# Patient Record
Sex: MALE | Race: WHITE | NOT HISPANIC OR LATINO | URBAN - METROPOLITAN AREA
[De-identification: names, ages, dates, MRNs, and addresses within clinical notes are randomized per-mention and may not be internally consistent; named-entity substitution may affect disease eponyms.]

---

## 2018-11-29 VITALS
TEMPERATURE: 99 F | DIASTOLIC BLOOD PRESSURE: 62 MMHG | SYSTOLIC BLOOD PRESSURE: 109 MMHG | OXYGEN SATURATION: 99 % | HEART RATE: 58 BPM | WEIGHT: 186.07 LBS | RESPIRATION RATE: 16 BRPM | HEIGHT: 70 IN

## 2018-11-29 PROBLEM — Z00.00 ENCOUNTER FOR PREVENTIVE HEALTH EXAMINATION: Status: ACTIVE | Noted: 2018-11-29

## 2018-11-29 RX ORDER — CHLORHEXIDINE GLUCONATE 213 G/1000ML
1 SOLUTION TOPICAL ONCE
Qty: 0 | Refills: 0 | Status: DISCONTINUED | OUTPATIENT
Start: 2018-11-30 | End: 2018-11-30

## 2018-11-29 NOTE — H&P ADULT - NSHPSOCIALHISTORY_GEN_ALL_CORE
EtoH: social wine but previously excessive daily EtoH use for many years; recreational Marijuana use - last used 2 months ago; denies Tob use.    Works as actor

## 2018-11-29 NOTE — H&P ADULT - ASSESSMENT
63 y.o Male former heavy EtoH user and recreational Marijuana user with FamHx of CAD and PMHx HTN, Hyperlipidemia, recently Dx DM (Hgb A1C 6.6), known CAD s/p 4V CABG 2011 At [facility] (LIMA to LAD, SVG to D1, Free MIGUEL from Diagonal Vein Yousif to OM1, SVG to PDA), chronic systolic CHF who given pt's risk factors, known CAD, abnormal ECHO and stress test with worsened LVEF, now presents for recommended cardiac catheterization with possible intervention to r/o progression of CAD.      ASA: III   Mallampati: III    Precath/consented  ASA 325mg PO taken this AM. Pt loaded with Plavix 600mg PO x 1.  IVF 1/2 NS @ 30cc/hr to KVO given EF 27% and Cr 0.82    Risks & benefits of procedure and alternative therapy have been explained to the patient including but not limited to: allergic reaction, bleeding w/possible need for blood transfusion, infection, renal and vascular compromise, limb damage, arrhythmia, stroke, vessel dissection/perforation, Myocardial infarction, emergent CABG. Informed consent obtained and in chart. 63 y.o Male former heavy EtoH user and recreational Marijuana user with FamHx of CAD and PMHx HTN, Hyperlipidemia, recently Dx DM (Hgb A1C 6.6), known CAD s/p 4V CABG 2011 At [facility] (LIMA to LAD, SVG to D1, Free MIGUEL from Diagonal Vein Yousif to OM1, SVG to PDA), chronic systolic CHF who given pt's risk factors, known CAD, abnormal ECHO and stress test with worsened LVEF, now presents for recommended cardiac catheterization with possible intervention to r/o progression of CAD.      ASA: III   Mallampati: III    Precath/consented  ASA 325mg PO taken this AM. Pt loaded with Plavix 600mg PO x 1.  IVF 1/2 NS @ 30cc/hr to KVO given EF 27% and Cr 0.82  K 3.1 pre-procedure, pt requesting PO KCl. Dr. Mathis made aware, pt repleted with KCl 60mEq PO x 1 prior to cath.    Risks & benefits of procedure and alternative therapy have been explained to the patient including but not limited to: allergic reaction, bleeding w/possible need for blood transfusion, infection, renal and vascular compromise, limb damage, arrhythmia, stroke, vessel dissection/perforation, Myocardial infarction, emergent CABG. Informed consent obtained and in chart.

## 2018-11-29 NOTE — H&P ADULT - HISTORY OF PRESENT ILLNESS
63 y.o Male former heavy EtoH user and recreational Marijuana user with PMHx HTN, Hyperlipidemia, recently Dx DM (Hgb A1C 6.6), known CAD s/p 4V CABG 2011 At [facility], chronic systolic CHF who presented to Cardiologist c/o 8 months of worsening dyspnea on exertion with increased fatigue along with worsening b/l lower extremity pain R>L after walking 1-2 blocks relieved with rest.  He denied any cp, palpitations, n/v, dizziness, syncope, orthopnea, pnd, pedal edema, non-healing ulcerations of legs, hair loss; numbness tingling or rest pain.  ECHO showed LVEF 27% with global hypokinesis and Pharm NST showed EF 32% with inferior and inferoapical infarct with mild dorothy-infarct ischemia.  Per MD notes LE Art duplex showed no stenosis.      Given pt's risk factors, known CAD, abnormal ECHO and stress test with worsened LVEF he is now referred for recommended cardiac catheterization with possible intervention to r/o progression of CAD. 63 y.o Male former heavy EtoH user and recreational Marijuana user with PMHx HTN, Hyperlipidemia, recently Dx DM (Hgb A1C 6.6), known CAD s/p 4V CABG 2011 At [facility], chronic systolic CHF who presented to Cardiologist c/o 8 months of worsening dyspnea on exertion with increased fatigue along with worsening b/l lower extremity pain R>L after walking 1-2 blocks relieved with rest.  He denied any cp, palpitations, n/v, dizziness, syncope, orthopnea, pnd, pedal edema, non-healing ulcerations of legs, hair loss; numbness tingling or rest pain.  ECHO showed LVEF 27% with global hypokinesis, moderate pulmonary HTN PASP approx 50mmHg and Pharm NST showed EF 32% with inferior and inferoapical infarct with mild dorothy-infarct ischemia.  Per MD notes LE Pietro duplex showed no stenosis.      Given pt's risk factors, known CAD, abnormal ECHO and stress test with worsened LVEF he is now referred for recommended cardiac catheterization with possible intervention to r/o progression of CAD. 63 y.o Male former heavy EtoH user and recreational Marijuana user with FamHx of CAD and PMHx HTN, Hyperlipidemia, recently Dx DM (Hgb A1C 6.6), known CAD s/p 4V CABG 2011 At [facility] (LIMA to LAD, SVG to D1, Free MIGUEL from Diagonal Vein Yousif to OM1, SVG to PDA, chronic systolic CHF who presented to Cardiologist c/o 8 months of worsening dyspnea on exertion with increased fatigue along with worsening b/l lower extremity pain R>L after walking 1-2 blocks relieved with rest.  He denied any cp, palpitations, n/v, dizziness, syncope, orthopnea, pnd, pedal edema, non-healing ulcerations of legs, hair loss; numbness tingling or rest pain.  ECHO showed LVEF 27% with global hypokinesis, moderate pulmonary HTN PASP approx 50mmHg and Pharm NST showed EF 32% with inferior and inferoapical infarct with mild dorothy-infarct ischemia.  Per MD notes LE Art duplex showed no stenosis.      Given pt's risk factors, known CAD, abnormal ECHO and stress test with worsened LVEF he is now referred for recommended cardiac catheterization with possible intervention to r/o progression of CAD.

## 2018-11-29 NOTE — H&P ADULT - PMH
CAD (coronary artery disease)    Diabetes  recently diagnosed 1 month ago  Essential hypertension    Hyperlipidemia    Osteoarthritis  of b/l feet

## 2018-11-29 NOTE — H&P ADULT - FAMILY HISTORY
No pertinent family history in first degree relatives Uncle  Still living? Unknown  Family history of early CAD, Age at diagnosis: Age Unknown

## 2018-11-29 NOTE — H&P ADULT - NSHPLABSRESULTS_GEN_ALL_CORE
16.5   9.4   )-----------( 152      ( 30 Nov 2018 12:00 )             50.7   PT/INR - ( 30 Nov 2018 12:00 )   PT: 11.5 sec;   INR: 1.02          PTT - ( 30 Nov 2018 12:00 )  PTT:28.5 sec  11-30    143  |  112<H>  |  24<H>  ----------------------------<  93  3.1<L>   |  22  |  0.82    Ca    7.0<L>      30 Nov 2018 12:00    TPro  6.0  /  Alb  3.5  /  TBili  0.2  /  DBili  x   /  AST  15  /  ALT  23  /  AlkPhos  57  11-30    EKG: Sinus Tyrone @ 57bpm with TWI in V5, V6, aVL. Flattened TWI in I, II, III, Q waves in III and aVF

## 2018-11-29 NOTE — H&P ADULT - RS GEN PE MLT RESP DETAILS PC
no intercostal retractions/no rales/no rhonchi/no wheezes/respirations non-labored/clear to auscultation bilaterally

## 2018-11-30 ENCOUNTER — OUTPATIENT (OUTPATIENT)
Dept: OUTPATIENT SERVICES | Facility: HOSPITAL | Age: 63
LOS: 1 days | Discharge: NURSING, REHAB OR LONG-TERM CARE | End: 2018-11-30
Payer: MEDICARE

## 2018-11-30 DIAGNOSIS — Z95.1 PRESENCE OF AORTOCORONARY BYPASS GRAFT: Chronic | ICD-10-CM

## 2018-11-30 LAB
ALBUMIN SERPL ELPH-MCNC: 3.5 G/DL — SIGNIFICANT CHANGE UP (ref 3.3–5)
ALP SERPL-CCNC: 57 U/L — SIGNIFICANT CHANGE UP (ref 40–120)
ALT FLD-CCNC: 23 U/L — SIGNIFICANT CHANGE UP (ref 10–45)
ANION GAP SERPL CALC-SCNC: 9 MMOL/L — SIGNIFICANT CHANGE UP (ref 5–17)
APTT BLD: 28.5 SEC — SIGNIFICANT CHANGE UP (ref 27.5–36.3)
AST SERPL-CCNC: 15 U/L — SIGNIFICANT CHANGE UP (ref 10–40)
BASOPHILS NFR BLD AUTO: 0.2 % — SIGNIFICANT CHANGE UP (ref 0–2)
BILIRUB SERPL-MCNC: 0.2 MG/DL — SIGNIFICANT CHANGE UP (ref 0.2–1.2)
BUN SERPL-MCNC: 24 MG/DL — HIGH (ref 7–23)
CALCIUM SERPL-MCNC: 7 MG/DL — LOW (ref 8.4–10.5)
CHLORIDE SERPL-SCNC: 112 MMOL/L — HIGH (ref 96–108)
CHOLEST SERPL-MCNC: 123 MG/DL — SIGNIFICANT CHANGE UP (ref 10–199)
CO2 SERPL-SCNC: 22 MMOL/L — SIGNIFICANT CHANGE UP (ref 22–31)
CREAT SERPL-MCNC: 0.82 MG/DL — SIGNIFICANT CHANGE UP (ref 0.5–1.3)
CRP SERPL-MCNC: 1.26 MG/DL — HIGH (ref 0–0.4)
EOSINOPHIL NFR BLD AUTO: 0.7 % — SIGNIFICANT CHANGE UP (ref 0–6)
GLUCOSE SERPL-MCNC: 93 MG/DL — SIGNIFICANT CHANGE UP (ref 70–99)
HBA1C BLD-MCNC: 6 % — HIGH (ref 4–5.6)
HCT VFR BLD CALC: 50.7 % — HIGH (ref 39–50)
HDLC SERPL-MCNC: 26 MG/DL — LOW
HGB BLD-MCNC: 16.5 G/DL — SIGNIFICANT CHANGE UP (ref 13–17)
INR BLD: 1.02 — SIGNIFICANT CHANGE UP (ref 0.88–1.16)
LIPID PNL WITH DIRECT LDL SERPL: 72 MG/DL — SIGNIFICANT CHANGE UP
LYMPHOCYTES # BLD AUTO: 16.1 % — SIGNIFICANT CHANGE UP (ref 13–44)
MCHC RBC-ENTMCNC: 27.9 PG — SIGNIFICANT CHANGE UP (ref 27–34)
MCHC RBC-ENTMCNC: 32.5 G/DL — SIGNIFICANT CHANGE UP (ref 32–36)
MCV RBC AUTO: 85.6 FL — SIGNIFICANT CHANGE UP (ref 80–100)
MONOCYTES NFR BLD AUTO: 9.3 % — SIGNIFICANT CHANGE UP (ref 2–14)
NEUTROPHILS NFR BLD AUTO: 73.7 % — SIGNIFICANT CHANGE UP (ref 43–77)
PLATELET # BLD AUTO: 152 K/UL — SIGNIFICANT CHANGE UP (ref 150–400)
POTASSIUM SERPL-MCNC: 3.1 MMOL/L — LOW (ref 3.5–5.3)
POTASSIUM SERPL-SCNC: 3.1 MMOL/L — LOW (ref 3.5–5.3)
PROT SERPL-MCNC: 6 G/DL — SIGNIFICANT CHANGE UP (ref 6–8.3)
PROTHROM AB SERPL-ACNC: 11.5 SEC — SIGNIFICANT CHANGE UP (ref 10–12.9)
RBC # BLD: 5.92 M/UL — HIGH (ref 4.2–5.8)
RBC # FLD: 14.3 % — SIGNIFICANT CHANGE UP (ref 10.3–16.9)
SODIUM SERPL-SCNC: 143 MMOL/L — SIGNIFICANT CHANGE UP (ref 135–145)
TOTAL CHOLESTEROL/HDL RATIO MEASUREMENT: 4.7 RATIO — SIGNIFICANT CHANGE UP (ref 3.4–9.6)
TRIGL SERPL-MCNC: 125 MG/DL — SIGNIFICANT CHANGE UP (ref 10–149)
WBC # BLD: 9.4 K/UL — SIGNIFICANT CHANGE UP (ref 3.8–10.5)
WBC # FLD AUTO: 9.4 K/UL — SIGNIFICANT CHANGE UP (ref 3.8–10.5)

## 2018-11-30 PROCEDURE — 86140 C-REACTIVE PROTEIN: CPT

## 2018-11-30 PROCEDURE — 93459 L HRT ART/GRFT ANGIO: CPT | Mod: 26

## 2018-11-30 PROCEDURE — G0278: CPT

## 2018-11-30 PROCEDURE — 80053 COMPREHEN METABOLIC PANEL: CPT

## 2018-11-30 PROCEDURE — 85730 THROMBOPLASTIN TIME PARTIAL: CPT

## 2018-11-30 PROCEDURE — C1889: CPT

## 2018-11-30 PROCEDURE — 85610 PROTHROMBIN TIME: CPT

## 2018-11-30 PROCEDURE — C1769: CPT

## 2018-11-30 PROCEDURE — 85025 COMPLETE CBC W/AUTO DIFF WBC: CPT

## 2018-11-30 PROCEDURE — 93010 ELECTROCARDIOGRAM REPORT: CPT

## 2018-11-30 PROCEDURE — 80061 LIPID PANEL: CPT

## 2018-11-30 PROCEDURE — 93005 ELECTROCARDIOGRAM TRACING: CPT

## 2018-11-30 PROCEDURE — C1894: CPT

## 2018-11-30 PROCEDURE — C1887: CPT

## 2018-11-30 PROCEDURE — 83036 HEMOGLOBIN GLYCOSYLATED A1C: CPT

## 2018-11-30 PROCEDURE — 93567 NJX CAR CTH SPRVLV AORTGRPHY: CPT | Mod: XS

## 2018-11-30 PROCEDURE — 75630 X-RAY AORTA LEG ARTERIES: CPT | Mod: 26,59

## 2018-11-30 PROCEDURE — 93459 L HRT ART/GRFT ANGIO: CPT

## 2018-11-30 RX ORDER — SODIUM CHLORIDE 9 MG/ML
500 INJECTION, SOLUTION INTRAVENOUS
Qty: 0 | Refills: 0 | Status: DISCONTINUED | OUTPATIENT
Start: 2018-11-30 | End: 2018-11-30

## 2018-11-30 RX ORDER — POTASSIUM CHLORIDE 20 MEQ
60 PACKET (EA) ORAL ONCE
Qty: 0 | Refills: 0 | Status: COMPLETED | OUTPATIENT
Start: 2018-11-30 | End: 2018-11-30

## 2018-11-30 RX ORDER — CLOPIDOGREL BISULFATE 75 MG/1
600 TABLET, FILM COATED ORAL ONCE
Qty: 0 | Refills: 0 | Status: COMPLETED | OUTPATIENT
Start: 2018-11-30 | End: 2018-11-30

## 2018-11-30 RX ADMIN — Medication 60 MILLIEQUIVALENT(S): at 13:01

## 2018-11-30 RX ADMIN — SODIUM CHLORIDE 30 MILLILITER(S): 9 INJECTION, SOLUTION INTRAVENOUS at 13:02

## 2018-11-30 RX ADMIN — CLOPIDOGREL BISULFATE 600 MILLIGRAM(S): 75 TABLET, FILM COATED ORAL at 13:01

## 2018-11-30 NOTE — PROGRESS NOTE ADULT - SUBJECTIVE AND OBJECTIVE BOX
Interventional Cardiology YULY OBREGONA Discharge Note    Patient without complaints. Ambulated and voided without difficulties    Afebrile, VSS    Ext:    		Left   Groin:  No  hematoma, no bruit, dressing; C/D/I  		      Pulses:    intact DP/PT pulses to baseline     A/P:    64 y/o Male former heavy EtoH user and recreational Marijuana user with FamHx of CAD and PMHx HTN, Hyperlipidemia, recently Dx DM (Hgb A1C 6.6), known CAD s/p 4V CABG 2011 At [facility] (LIMA to LAD, SVG to D1, Free MIGUEL from Diagonal Vein Yousif to OM1, SVG to PDA, chronic systolic CHF who presented to Cardiologist c/o 8 months of worsening dyspnea on exertion with increased fatigue along with worsening b/l lower extremity pain R>L after walking 1-2 blocks relieved with rest.  He denied any cp, palpitations, n/v, dizziness, syncope, orthopnea, pnd, pedal edema, non-healing ulcerations of legs, hair loss; numbness tingling or rest pain.  ECHO showed LVEF 27% with global hypokinesis, moderate pulmonary HTN PASP approx 50mmHg and Pharm NST showed EF 32% with inferior and inferoapical infarct with mild dorothy-infarct ischemia. Per MD notes LE Art duplex showed no stenosis. Given pt's risk factors, known CAD, abnormal ECHO and stress test with worsened LVEF he is now referred for recommended cardiac catheterization with possible intervention to r/o progression of CAD. The patient is now s/p cardiac catheterization 11/30/18 revealing ? left groin manual sheath pull.         1.	Stable for discharge as per attending Dr. Mathis after bed rest, pt voids, groin stable and 30 minutes of ambulation.  2.	Follow-up with PMD/Cardiologist Dr. Bo in 1-2 weeks  3.	Discharged forms signed and copies in chart Interventional Cardiology PA SDA Discharge Note    Patient without complaints. Ambulated and voided without difficulties    Afebrile, VSS    Ext:    		Left   Groin:  No  hematoma, no bruit, dressing; C/D/I  		      Pulses:    intact DP/PT pulses to baseline     A/P:    64 y/o Male former heavy EtoH user and recreational Marijuana user with FamHx of CAD and PMHx HTN, Hyperlipidemia, recently Dx DM (Hgb A1C 6.6), known CAD s/p 4V CABG 2011 At [facility] (LIMA to LAD, SVG to D1, Free MIGUEL from Diagonal Vein Yousif to OM1, SVG to PDA, chronic systolic CHF who presented to Cardiologist c/o 8 months of worsening dyspnea on exertion with increased fatigue along with worsening b/l lower extremity pain R>L after walking 1-2 blocks relieved with rest.  He denied any cp, palpitations, n/v, dizziness, syncope, orthopnea, pnd, pedal edema, non-healing ulcerations of legs, hair loss; numbness tingling or rest pain.  ECHO showed LVEF 27% with global hypokinesis, moderate pulmonary HTN PASP approx 50mmHg and Pharm NST showed EF 32% with inferior and inferoapical infarct with mild dorothy-infarct ischemia. Per MD notes LE Art duplex showed no stenosis. Given pt's risk factors, known CAD, abnormal ECHO and stress test with worsened LVEF he is now referred for recommended cardiac catheterization with possible intervention to r/o progression of CAD. The patient is now s/p cardiac catheterization 11/30/18 revealing 3VCAD s/p CABG; LMCA- luminal, LAD- 100% mid , D1- 100% , LCX- OM1 with 70-80% stenosis, RCA- dominant, mid-RCA  (fills via R-R collaterals mostly); Grafts: LIMA-LAD patent, free MIGUEL-D1 patent, SVG-OM1- patent, SVG-RCA- occluded, LVEF 30%, inferior akinesis, LVEDP ~20mmHg, no AS, 1+MR; Aortogram/Iliac Angiography:  of the common iliac artery. Left groin manual sheath pull.         1.	Stable for discharge as per attending Dr. Mathis after bed rest, pt voids, groin stable and 30 minutes of ambulation.  2.	Follow-up with PMD/Cardiologist Dr. Bo in 1-2 weeks  3.	Discharged forms signed and copies in chart Interventional Cardiology PA SDA Discharge Note    Patient without complaints. Ambulated and voided without difficulties    Afebrile, VSS    Ext:    		Left   Groin:  No  hematoma, no bruit, dressing; C/D/I  		      Pulses:    intact DP/PT pulses to baseline     A/P:    64 y/o Male former heavy EtoH user and recreational Marijuana user with FamHx of CAD and PMHx HTN, Hyperlipidemia, recently Dx DM (Hgb A1C 6.6), known CAD s/p 4V CABG 2011 At [facility] (LIMA to LAD, SVG to D1, Free MIGUEL from Diagonal Vein Yousif to OM1, SVG to PDA, chronic systolic CHF who presented to Cardiologist c/o 8 months of worsening dyspnea on exertion with increased fatigue along with worsening b/l lower extremity pain R>L after walking 1-2 blocks relieved with rest.  He denied any cp, palpitations, n/v, dizziness, syncope, orthopnea, pnd, pedal edema, non-healing ulcerations of legs, hair loss; numbness tingling or rest pain.  ECHO showed LVEF 27% with global hypokinesis, moderate pulmonary HTN PASP approx 50mmHg and Pharm NST showed EF 32% with inferior and inferoapical infarct with mild dorothy-infarct ischemia. Per MD notes LE Art duplex showed no stenosis. Given pt's risk factors, known CAD, abnormal ECHO and stress test with worsened LVEF he is now referred for recommended cardiac catheterization with possible intervention to r/o progression of CAD. The patient is now s/p cardiac catheterization 11/30/18 revealing 3VCAD s/p CABG; LMCA- luminal, LAD- 100% mid , D1- 100% , LCX- OM1 with 70-80% stenosis, RCA- dominant, mid-RCA  (fills via R-R collaterals mostly); Grafts: LIMA-LAD patent, free MIGUEL-D1 patent, SVG-OM1- patent, SVG-RCA- occluded, LVEF 30%, inferior akinesis, LVEDP ~20mmHg, no AS, 1+MR; Aortogram/Iliac Angiography:  of the common iliac artery. Left groin manual sheath pull. Patient instructed to follow-up with Dr. Manzanares/Dr. Agosto for PVD.     1.	Stable for discharge as per attending Dr. Mathis after bed rest, pt voids, groin stable and 30 minutes of ambulation.  2.	Follow-up with PMD/Cardiologist Dr. Bo in 1-2 weeks  3.	Discharged forms signed and copies in chart

## 2018-12-07 ENCOUNTER — MEDICATION RENEWAL (OUTPATIENT)
Age: 63
End: 2018-12-07

## 2018-12-10 PROBLEM — I10 ESSENTIAL (PRIMARY) HYPERTENSION: Chronic | Status: ACTIVE | Noted: 2018-11-29

## 2018-12-10 PROBLEM — E78.5 HYPERLIPIDEMIA, UNSPECIFIED: Chronic | Status: ACTIVE | Noted: 2018-11-29

## 2018-12-10 PROBLEM — I25.10 ATHEROSCLEROTIC HEART DISEASE OF NATIVE CORONARY ARTERY WITHOUT ANGINA PECTORIS: Chronic | Status: ACTIVE | Noted: 2018-11-29

## 2018-12-10 PROBLEM — M19.90 UNSPECIFIED OSTEOARTHRITIS, UNSPECIFIED SITE: Chronic | Status: ACTIVE | Noted: 2018-11-29

## 2018-12-11 ENCOUNTER — APPOINTMENT (OUTPATIENT)
Dept: HEART AND VASCULAR | Facility: CLINIC | Age: 63
End: 2018-12-11
Payer: MEDICARE

## 2018-12-11 VITALS
DIASTOLIC BLOOD PRESSURE: 70 MMHG | WEIGHT: 193.98 LBS | HEIGHT: 68 IN | HEART RATE: 105 BPM | BODY MASS INDEX: 29.4 KG/M2 | SYSTOLIC BLOOD PRESSURE: 110 MMHG | OXYGEN SATURATION: 97 %

## 2018-12-11 VITALS
SYSTOLIC BLOOD PRESSURE: 104 MMHG | OXYGEN SATURATION: 97 % | WEIGHT: 193 LBS | DIASTOLIC BLOOD PRESSURE: 70 MMHG | HEART RATE: 105 BPM | BODY MASS INDEX: 29.25 KG/M2 | RESPIRATION RATE: 12 BRPM | TEMPERATURE: 98.5 F | HEIGHT: 68 IN

## 2018-12-11 VITALS
SYSTOLIC BLOOD PRESSURE: 103 MMHG | TEMPERATURE: 98 F | OXYGEN SATURATION: 97 % | DIASTOLIC BLOOD PRESSURE: 63 MMHG | WEIGHT: 184.97 LBS | RESPIRATION RATE: 17 BRPM | HEART RATE: 66 BPM | HEIGHT: 69 IN

## 2018-12-11 VITALS — TEMPERATURE: 98.5 F | SYSTOLIC BLOOD PRESSURE: 104 MMHG | DIASTOLIC BLOOD PRESSURE: 70 MMHG

## 2018-12-11 DIAGNOSIS — Z86.79 PERSONAL HISTORY OF OTHER DISEASES OF THE CIRCULATORY SYSTEM: ICD-10-CM

## 2018-12-11 DIAGNOSIS — Z78.9 OTHER SPECIFIED HEALTH STATUS: ICD-10-CM

## 2018-12-11 PROCEDURE — 99214 OFFICE O/P EST MOD 30 MIN: CPT

## 2018-12-11 RX ORDER — METOPROLOL TARTRATE 50 MG
1 TABLET ORAL
Qty: 0 | Refills: 0 | COMMUNITY

## 2018-12-11 RX ORDER — ASPIRIN/CALCIUM CARB/MAGNESIUM 324 MG
1 TABLET ORAL
Qty: 0 | Refills: 0 | COMMUNITY

## 2018-12-11 RX ORDER — IBUPROFEN AND FAMOTIDINE 26.6; 8 MG/1; MG/1
1 TABLET, FILM COATED ORAL
Qty: 0 | Refills: 0 | COMMUNITY

## 2018-12-11 RX ORDER — ALLOPURINOL 300 MG
1 TABLET ORAL
Qty: 0 | Refills: 0 | COMMUNITY

## 2018-12-11 NOTE — H&P ADULT - FAMILY HISTORY
Uncle  Still living? Unknown  Family history of early CAD, Age at diagnosis: Age Unknown Uncle  Still living? Unknown  Family history of early CAD, Age at diagnosis: Age Unknown     Mother  Still living? Unknown  Family history of MI (myocardial infarction), Age at diagnosis: Age Unknown

## 2018-12-11 NOTE — H&P ADULT - ASSESSMENT
Euvolemic on exam. IVF hydration: 1/2NS @ 50cc/hr and 1/2NS bolus over 1 hour pre-procedure as d/w Dr. Agosto.  Pt reports compliance to taking ASA 325mg daily and took his AM dose. As d/w Dr. Agosto, pt is loaded with Plavix 600mg PO x1 STAT pre-procedure. 62 y/o Male, former heavy EtoH user and recreational Marijuana user (last use >2 months ago), with FHx of CAD and PMHx of HTN, HLD, DM, Chronic Systolic CHF, known CAD s/p 4V CABG 2011 @ [facility] with most recent Cardiac Cath @ [facility] 11/30/2018, and known PVD with Aortogram/Iliac Angiography 11/30/2018:  of the Common Iliac Artery who presents for staged Peripheral PCI in the setting of patient's risk factors, known PVD, abnormal peripheral Angiography, and worsening LE claudication.    ASA III  Mallampati III    Risks & benefits of procedure and alternative therapy have been explained to the patient including but not limited to: allergic reaction, bleeding w/possible need for blood transfusion, infection, renal and vascular compromise, limb damage, stroke, Myocardial infarction, vessel dissection/perforation, emergent Vascular Surgery. Informed consent obtained and in chart.       Precath/  Consented  - Euvolemic on exam in the setting of LVEF 30% (11/30/18). IVF hydration: 1/2NS @ 50cc/hr and 1/2NS bolus over 1 hour pre-procedure as d/w Dr. Agosto.  - Pt reports compliance to taking ASA 325mg daily and took his AM dose. As d/w Dr. Agosto, pt is loaded with Plavix 600mg PO x1 STAT pre-procedure.   - Today's Hemoglobin A1S is 6.1. Pt is educated about Pre-DM by bedside and encouraged to F/U with PCP. 62 y/o Male, former heavy EtoH user and recreational Marijuana user (last use >2 months ago), with FHx of CAD and PMHx of HTN, HLD, DM, Chronic Systolic CHF, known CAD s/p 4V CABG 2011 @ [facility] with most recent Cardiac Cath @ [facility] 11/30/2018, and known PVD with Aortogram/Iliac Angiography 11/30/2018:  of the Common Iliac Artery who presents for staged Peripheral PCI in the setting of patient's risk factors, known PVD, abnormal peripheral Angiography, and worsening LE claudication.    ASA III  Mallampati III    Risks & benefits of procedure and alternative therapy have been explained to the patient including but not limited to: allergic reaction, bleeding w/possible need for blood transfusion, infection, renal and vascular compromise, limb damage, stroke, Myocardial infarction, vessel dissection/perforation, emergent Vascular Surgery. Informed consent obtained and in chart.       Precath/  Consented  - CKD Stage II: today's creatinine is 1.23 and GFR 62 (previous 10/30/18 creatinine is 0.82 and GFR 94). Euvolemic on exam in the setting of LVEF 30% (11/30/18). IVF hydration: 1/2NS @ 50cc/hr with frequent lung checks and 1/2NS bolus over 1 hour pre-procedure as d/w Dr. Agosto.  - Pt reports compliance to taking ASA 325mg daily and took his AM dose. As d/w Dr. Agosto, pt is loaded with Plavix 600mg PO x1 STAT pre-procedure.   - Today's Hemoglobin A1S is 6.1. Pt is educated about Pre-DM by bedside and encouraged to F/U with PCP.

## 2018-12-11 NOTE — H&P ADULT - HISTORY OF PRESENT ILLNESS
62 y/o Male, former heavy EtoH user and recreational Marijuana user, with FHx of CAD and PMHx of HTN, HLD, DM, Chronic Systolic CHF, known CAD s/p 4V CABG 2011 @ [facility] with most recent Cardiac Cath @ [facility] 11/30/2018, and known PVD with Aortogram/Iliac Angiography 11/30/2018:  of the Common Iliac Artery who presented to his Cardiologist, Dr. Haywood, c/o worsening b/l lower extremity pain R>L after walking 1-2 blocks relieved with rest. Patient denies ..... Given patient's risk factors, known PVD, abnormal peripheral Angiography, and worsening LE claudication, patient is now referred for staged Peripheral PCI.    Of note, Cr. 1.3 on 12/5/2018 and 1.2 8/27/2018 on outpatient labs.    VINH 10/5/2018: Moderate to severe Right PVD by ratios and PVR wave form, abnormal segmental pressure difference bilaterally.  Cardiac Cath @ [facility] 11/30/2018: revealing 3VCAD s/p CABG; LMCA: luminal <20%, mLAD:  complex 100%, pD1: 100% , pOM1: 75% diffuse, mRCA:  complex (fills via R-R collaterals mostly); Grafts: LIMA-LAD: widely patent, free MIGUEL-D1: widely patent, SVG-OM1: widely patent, SVG-RCA: , LVEF 30%, inferior akinesis, LVEDP ~20mmHg, no AS, 1+MR  Pharmacologic NST: inferior and inferoapical infarct with mild dorothy-infarct ischemia, per chart. *** pt bringing in [facility] CABG report***      62 y/o Male, former heavy EtoH user and recreational Marijuana user (last use >2 months ago), with FHx of CAD and PMHx of HTN, HLD, DM, Chronic Systolic CHF, known CAD s/p 4V CABG 2011 @ [facility] with most recent Cardiac Cath @ [facility] 11/30/2018, and known PVD with Aortogram/Iliac Angiography 11/30/2018:  of the Common Iliac Artery who presented to his Cardiologist, Dr. Haywood, c/o worsening b/l lower extremity pain R>L after walking 1-2 blocks or walking up stairs. The patient states that the pain is only better at rest, but comes back when he starts walking again. The pain is constant, 7/10, no radiation. Patient denies LE edema, ulcers in legs/feet, discoloration of LEs, change of hair pattern, CP, SOB, dizziness or syncope. Given patient's risk factors, known PVD, abnormal peripheral Angiography, and worsening LE claudication, patient is now referred for staged Peripheral PCI.    Of note, Cr. 1.3 on 12/5/2018 and 1.2 8/27/2018 on outpatient labs.    VINH 10/5/2018: Moderate to severe Right PVD by ratios and PVR wave form, abnormal segmental pressure difference bilaterally.  Cardiac Cath @ [facility] 11/30/2018: revealing 3VCAD s/p CABG; LMCA: luminal <20%, mLAD:  complex 100%, pD1: 100% , pOM1: 75% diffuse, mRCA:  complex (fills via R-R collaterals mostly); Grafts: LIMA-LAD: widely patent, free MIGUEL-D1: widely patent, SVG-OM1: widely patent, SVG-RCA: , LVEF 30%, inferior akinesis, LVEDP ~20mmHg, no AS, 1+MR  Pharmacologic NST: inferior and inferoapical infarct with mild dorothy-infarct ischemia, per chart. 64 y/o Male, former heavy EtoH user and recreational Marijuana user (last use >2 months ago), with FHx of CAD and PMHx of HTN, HLD, DM, Chronic Systolic CHF, known CAD s/p 4V CABG 2011 @ [facility] with most recent Cardiac Cath @ [facility] 11/30/2018, and known PVD with Aortogram/Iliac Angiography 11/30/2018:  of the Common Iliac Artery who presented to his Cardiologist, Dr. Haywood, c/o worsening b/l lower extremity pain R>L after walking 1-2 blocks or walking up stairs. The patient states that the pain is only better at rest, but comes back when he starts walking again. The pain is constant, 7/10, no radiation. Patient denies LE edema, ulcers in legs/feet, discoloration of LEs, change of hair pattern, CP, SOB, dizziness or syncope. Given patient's risk factors, known PVD, abnormal peripheral Angiography, and worsening LE claudication, patient is now referred for staged Peripheral PCI.    Of note, Cr. 1.3 on 12/5/2018 and 1.2 8/27/2018 on outpatient labs.    VINH 10/5/2018: Moderate to severe Right PVD by ratios and PVR wave form, abnormal segmental pressure difference bilaterally.  Cardiac Cath @ [facility] 11/30/2018: revealing 3VCAD s/p CABG; LMCA: luminal <20%, mLAD:  complex 100%, pD1: 100% , pOM1: 75% diffuse, mRCA:  complex (fills via R-R collaterals mostly); Grafts: LIMA-LAD: widely patent, free MIGUEL-D1: widely patent, SVG-OM1: widely patent, SVG-RCA: , LVEF 30%, inferior akinesis, LVEDP ~20mmHg, no AS, 1+MR  Pharmacologic NST: inferior and inferoapical infarct with mild dorothy-infarct ischemia, per chart.

## 2018-12-11 NOTE — H&P ADULT - SCARS
CABG scars: + well healed midline scar to anterior chest and + scar on RLE towards the midline of the body/location

## 2018-12-11 NOTE — H&P ADULT - PMH
CAD (coronary artery disease)    Diabetes  recently diagnosed 1 month ago  Essential hypertension    Hyperlipidemia    Osteoarthritis  of b/l feet  PVD (peripheral vascular disease)

## 2018-12-11 NOTE — H&P ADULT - NSHPSOCIALHISTORY_GEN_ALL_CORE
EtoH: social wine but previously excessive daily EtoH use for many years; recreational Marijuana use - last used 2 months ago; denies Tob use.

## 2018-12-11 NOTE — H&P ADULT - NSHPLABSRESULTS_GEN_ALL_CORE
15.3   5.9   )-----------( 212      ( 12 Dec 2018 07:54 )             46.3   PT/INR - ( 12 Dec 2018 07:54 )   PT: 11.0 sec;   INR: 0.97          PTT - ( 12 Dec 2018 07:54 )  PTT:29.8 sec 15.3   5.9   )-----------( 212      ( 12 Dec 2018 07:54 )             46.3   PT/INR - ( 12 Dec 2018 07:54 )   PT: 11.0 sec;   INR: 0.97          PTT - ( 12 Dec 2018 07:54 )  PTT:29.8 sec  12-12    138  |  98  |  27<H>  ----------------------------<  112<H>  4.2   |  22  |  1.23    Ca    9.3      12 Dec 2018 07:54    TPro  7.9  /  Alb  4.3  /  TBili  0.2  /  DBili  x   /  AST  40  /  ALT  39  /  AlkPhos  65  12-12

## 2018-12-12 ENCOUNTER — INPATIENT (INPATIENT)
Facility: HOSPITAL | Age: 63
LOS: 0 days | Discharge: HOME | DRG: 271 | End: 2018-12-13
Attending: INTERNAL MEDICINE | Admitting: INTERNAL MEDICINE
Payer: COMMERCIAL

## 2018-12-12 DIAGNOSIS — Z95.1 PRESENCE OF AORTOCORONARY BYPASS GRAFT: Chronic | ICD-10-CM

## 2018-12-12 LAB
ALBUMIN SERPL ELPH-MCNC: 4.3 G/DL — SIGNIFICANT CHANGE UP (ref 3.3–5)
ALP SERPL-CCNC: 65 U/L — SIGNIFICANT CHANGE UP (ref 40–120)
ALT FLD-CCNC: 39 U/L — SIGNIFICANT CHANGE UP (ref 10–45)
ANION GAP SERPL CALC-SCNC: 18 MMOL/L — HIGH (ref 5–17)
APTT BLD: 29.8 SEC — SIGNIFICANT CHANGE UP (ref 27.5–36.3)
AST SERPL-CCNC: 40 U/L — SIGNIFICANT CHANGE UP (ref 10–40)
BASOPHILS NFR BLD AUTO: 0.8 % — SIGNIFICANT CHANGE UP (ref 0–2)
BILIRUB SERPL-MCNC: 0.2 MG/DL — SIGNIFICANT CHANGE UP (ref 0.2–1.2)
BUN SERPL-MCNC: 27 MG/DL — HIGH (ref 7–23)
CALCIUM SERPL-MCNC: 9.3 MG/DL — SIGNIFICANT CHANGE UP (ref 8.4–10.5)
CHLORIDE SERPL-SCNC: 98 MMOL/L — SIGNIFICANT CHANGE UP (ref 96–108)
CHOLEST SERPL-MCNC: 171 MG/DL — SIGNIFICANT CHANGE UP (ref 10–199)
CO2 SERPL-SCNC: 22 MMOL/L — SIGNIFICANT CHANGE UP (ref 22–31)
CREAT SERPL-MCNC: 1.23 MG/DL — SIGNIFICANT CHANGE UP (ref 0.5–1.3)
CRP SERPL-MCNC: 0.46 MG/DL — HIGH (ref 0–0.4)
EOSINOPHIL NFR BLD AUTO: 3.5 % — SIGNIFICANT CHANGE UP (ref 0–6)
GLUCOSE SERPL-MCNC: 112 MG/DL — HIGH (ref 70–99)
HBA1C BLD-MCNC: 6.1 % — HIGH (ref 4–5.6)
HCT VFR BLD CALC: 46.3 % — SIGNIFICANT CHANGE UP (ref 39–50)
HDLC SERPL-MCNC: 38 MG/DL — LOW
HGB BLD-MCNC: 15.3 G/DL — SIGNIFICANT CHANGE UP (ref 13–17)
INR BLD: 0.97 — SIGNIFICANT CHANGE UP (ref 0.88–1.16)
LIPID PNL WITH DIRECT LDL SERPL: 100 MG/DL — SIGNIFICANT CHANGE UP
LYMPHOCYTES # BLD AUTO: 26.7 % — SIGNIFICANT CHANGE UP (ref 13–44)
MCHC RBC-ENTMCNC: 27.7 PG — SIGNIFICANT CHANGE UP (ref 27–34)
MCHC RBC-ENTMCNC: 33 G/DL — SIGNIFICANT CHANGE UP (ref 32–36)
MCV RBC AUTO: 83.9 FL — SIGNIFICANT CHANGE UP (ref 80–100)
MONOCYTES NFR BLD AUTO: 11.3 % — SIGNIFICANT CHANGE UP (ref 2–14)
NEUTROPHILS NFR BLD AUTO: 57.7 % — SIGNIFICANT CHANGE UP (ref 43–77)
PLATELET # BLD AUTO: 212 K/UL — SIGNIFICANT CHANGE UP (ref 150–400)
POTASSIUM SERPL-MCNC: 4.2 MMOL/L — SIGNIFICANT CHANGE UP (ref 3.5–5.3)
POTASSIUM SERPL-SCNC: 4.2 MMOL/L — SIGNIFICANT CHANGE UP (ref 3.5–5.3)
PROT SERPL-MCNC: 7.9 G/DL — SIGNIFICANT CHANGE UP (ref 6–8.3)
PROTHROM AB SERPL-ACNC: 11 SEC — SIGNIFICANT CHANGE UP (ref 10–12.9)
RBC # BLD: 5.52 M/UL — SIGNIFICANT CHANGE UP (ref 4.2–5.8)
RBC # FLD: 14.2 % — SIGNIFICANT CHANGE UP (ref 10.3–16.9)
SODIUM SERPL-SCNC: 138 MMOL/L — SIGNIFICANT CHANGE UP (ref 135–145)
TOTAL CHOLESTEROL/HDL RATIO MEASUREMENT: 4.5 RATIO — SIGNIFICANT CHANGE UP (ref 3.4–9.6)
TRIGL SERPL-MCNC: 167 MG/DL — HIGH (ref 10–149)
WBC # BLD: 5.9 K/UL — SIGNIFICANT CHANGE UP (ref 3.8–10.5)
WBC # FLD AUTO: 5.9 K/UL — SIGNIFICANT CHANGE UP (ref 3.8–10.5)

## 2018-12-12 PROCEDURE — 93010 ELECTROCARDIOGRAM REPORT: CPT

## 2018-12-12 RX ORDER — SODIUM CHLORIDE 9 MG/ML
500 INJECTION, SOLUTION INTRAVENOUS
Qty: 0 | Refills: 0 | Status: DISCONTINUED | OUTPATIENT
Start: 2018-12-12 | End: 2018-12-12

## 2018-12-12 RX ORDER — CLOPIDOGREL BISULFATE 75 MG/1
75 TABLET, FILM COATED ORAL DAILY
Qty: 0 | Refills: 0 | Status: DISCONTINUED | OUTPATIENT
Start: 2018-12-13 | End: 2018-12-13

## 2018-12-12 RX ORDER — CLOPIDOGREL BISULFATE 75 MG/1
600 TABLET, FILM COATED ORAL ONCE
Qty: 0 | Refills: 0 | Status: COMPLETED | OUTPATIENT
Start: 2018-12-12 | End: 2018-12-12

## 2018-12-12 RX ORDER — ATORVASTATIN CALCIUM 80 MG/1
20 TABLET, FILM COATED ORAL AT BEDTIME
Qty: 0 | Refills: 0 | Status: DISCONTINUED | OUTPATIENT
Start: 2018-12-12 | End: 2018-12-13

## 2018-12-12 RX ORDER — CHLORHEXIDINE GLUCONATE 213 G/1000ML
1 SOLUTION TOPICAL ONCE
Qty: 0 | Refills: 0 | Status: DISCONTINUED | OUTPATIENT
Start: 2018-12-12 | End: 2018-12-12

## 2018-12-12 RX ORDER — CILOSTAZOL 100 MG/1
100 TABLET ORAL
Qty: 0 | Refills: 0 | Status: DISCONTINUED | OUTPATIENT
Start: 2018-12-12 | End: 2018-12-13

## 2018-12-12 RX ORDER — LANOLIN ALCOHOL/MO/W.PET/CERES
5 CREAM (GRAM) TOPICAL AT BEDTIME
Qty: 0 | Refills: 0 | Status: DISCONTINUED | OUTPATIENT
Start: 2018-12-12 | End: 2018-12-13

## 2018-12-12 RX ORDER — INFLUENZA VIRUS VACCINE 15; 15; 15; 15 UG/.5ML; UG/.5ML; UG/.5ML; UG/.5ML
0.5 SUSPENSION INTRAMUSCULAR ONCE
Qty: 0 | Refills: 0 | Status: DISCONTINUED | OUTPATIENT
Start: 2018-12-12 | End: 2018-12-13

## 2018-12-12 RX ORDER — ASPIRIN/CALCIUM CARB/MAGNESIUM 324 MG
81 TABLET ORAL DAILY
Qty: 0 | Refills: 0 | Status: DISCONTINUED | OUTPATIENT
Start: 2018-12-13 | End: 2018-12-13

## 2018-12-12 RX ORDER — AMLODIPINE BESYLATE 2.5 MG/1
10 TABLET ORAL DAILY
Qty: 0 | Refills: 0 | Status: DISCONTINUED | OUTPATIENT
Start: 2018-12-13 | End: 2018-12-13

## 2018-12-12 RX ORDER — SODIUM CHLORIDE 9 MG/ML
1000 INJECTION, SOLUTION INTRAVENOUS
Qty: 0 | Refills: 0 | Status: DISCONTINUED | OUTPATIENT
Start: 2018-12-12 | End: 2018-12-13

## 2018-12-12 RX ADMIN — Medication 0.1 MILLIGRAM(S): at 23:03

## 2018-12-12 RX ADMIN — ATORVASTATIN CALCIUM 20 MILLIGRAM(S): 80 TABLET, FILM COATED ORAL at 23:02

## 2018-12-12 RX ADMIN — SODIUM CHLORIDE 50 MILLILITER(S): 9 INJECTION, SOLUTION INTRAVENOUS at 17:51

## 2018-12-12 RX ADMIN — SODIUM CHLORIDE 200 MILLILITER(S): 9 INJECTION, SOLUTION INTRAVENOUS at 10:00

## 2018-12-12 RX ADMIN — Medication 5 MILLIGRAM(S): at 23:02

## 2018-12-12 RX ADMIN — CLOPIDOGREL BISULFATE 600 MILLIGRAM(S): 75 TABLET, FILM COATED ORAL at 10:13

## 2018-12-12 RX ADMIN — CILOSTAZOL 100 MILLIGRAM(S): 100 TABLET ORAL at 18:14

## 2018-12-12 RX ADMIN — CILOSTAZOL 100 MILLIGRAM(S): 100 TABLET ORAL at 23:03

## 2018-12-12 RX ADMIN — SODIUM CHLORIDE 50 MILLILITER(S): 9 INJECTION, SOLUTION INTRAVENOUS at 08:23

## 2018-12-12 RX ADMIN — Medication 0.1 MILLIGRAM(S): at 18:14

## 2018-12-12 NOTE — PROVIDER CONTACT NOTE (OTHER) - RECOMMENDATIONS
Pt informed of risks of bleeding and hematoma formation by getting out of bed. Pt continues to get up

## 2018-12-12 NOTE — PROVIDER CONTACT NOTE (MEDICATION) - BACKGROUND
Pt s/p peripheral cath , PTA/BMS placed in RLE via L groin. L groin incision C/D/I. Pt being non-compliant with bedrest

## 2018-12-12 NOTE — PROGRESS NOTE ADULT - SUBJECTIVE AND OBJECTIVE BOX
Interventional Cardiology PA Sheath Pull Note    Pt without complaints. VSS      Pre-Sheath Removal:    [ ] Right     [x] Left          [ ] Groin    [ ] Brachial    [ ] 5Fr      [ ] 6Fr    [x] 7Fr     [ ] 8Fr    [x] Arterial  [ ] Venous sheath in place    [ ] Hematoma        [ ] Bleed    Pulses:    [ ] Right     [x] Left       DP:  [x]  Doppler   [ ]  Faint    [ ]   1+    [ ] 2+       Hemostasis Achieved with:         22        minutes manual pressure    Vasovagal Reaction: No    Meds Given:      Post-Sheath Removal:    [ ] Right     [x] Left          [ ] Groin    [ ] Brachial    [-] Hematoma        [-] Bleed       [-] Bruit    Pulses:    [ ] Right     [x] Left       DP:  [x]  Doppler   [ ]  Faint    [ ]   1+    [ ] 2+     A/P:  s/p [ ] Dx Cath      [ ] IVUS/FFR     [+] PTA/STENT       [ ] PTCA/STENT    -Continue bedrest (pt given instructions)  -Continue to monitor Interventional Cardiology PA Sheath Pull Note    Pt without complaints. VSS      Pre-Sheath Removal:    [ ] Right     [x] Left          [ ] Groin    [ ] Brachial    [ ] 5Fr      [ ] 6Fr    [x] 7Fr     [ ] 8Fr    [x] Arterial  [ ] Venous sheath in place    [-] Hematoma        [-] Bleed    Pulses:    [ ] Right     [x] Left       DP:  [x]  Doppler   [ ]  Faint    [ ]   1+    [ ] 2+       Hemostasis Achieved with:         22        minutes manual pressure    Vasovagal Reaction: No    Meds Given: None      Post-Sheath Removal:    [ ] Right     [x] Left          [ ] Groin    [ ] Brachial    [-] Hematoma        [-] Bleed       [-] Bruit    Pulses:    [ ] Right     [x] Left       DP:  [x]  Doppler   [ ]  Faint    [ ]   1+    [ ] 2+     A/P:  s/p [ ] Dx Cath      [ ] IVUS/FFR     [+] PTA/ BMS STENT       [ ] PTCA/STENT    -Continue bedrest (pt given instructions)  -Continue to monitor

## 2018-12-13 ENCOUNTER — TRANSCRIPTION ENCOUNTER (OUTPATIENT)
Age: 63
End: 2018-12-13

## 2018-12-13 VITALS — TEMPERATURE: 97 F

## 2018-12-13 LAB
ANION GAP SERPL CALC-SCNC: 13 MMOL/L — SIGNIFICANT CHANGE UP (ref 5–17)
BASOPHILS NFR BLD AUTO: 0.4 % — SIGNIFICANT CHANGE UP (ref 0–2)
BUN SERPL-MCNC: 23 MG/DL — SIGNIFICANT CHANGE UP (ref 7–23)
CALCIUM SERPL-MCNC: 9 MG/DL — SIGNIFICANT CHANGE UP (ref 8.4–10.5)
CHLORIDE SERPL-SCNC: 102 MMOL/L — SIGNIFICANT CHANGE UP (ref 96–108)
CO2 SERPL-SCNC: 27 MMOL/L — SIGNIFICANT CHANGE UP (ref 22–31)
CREAT SERPL-MCNC: 1.44 MG/DL — HIGH (ref 0.5–1.3)
EOSINOPHIL NFR BLD AUTO: 3.4 % — SIGNIFICANT CHANGE UP (ref 0–6)
GLUCOSE SERPL-MCNC: 113 MG/DL — HIGH (ref 70–99)
HCT VFR BLD CALC: 43.8 % — SIGNIFICANT CHANGE UP (ref 39–50)
HGB BLD-MCNC: 14.3 G/DL — SIGNIFICANT CHANGE UP (ref 13–17)
LYMPHOCYTES # BLD AUTO: 8.8 % — LOW (ref 13–44)
MAGNESIUM SERPL-MCNC: 2.1 MG/DL — SIGNIFICANT CHANGE UP (ref 1.6–2.6)
MCHC RBC-ENTMCNC: 27.8 PG — SIGNIFICANT CHANGE UP (ref 27–34)
MCHC RBC-ENTMCNC: 32.6 G/DL — SIGNIFICANT CHANGE UP (ref 32–36)
MCV RBC AUTO: 85.2 FL — SIGNIFICANT CHANGE UP (ref 80–100)
MONOCYTES NFR BLD AUTO: 13.1 % — SIGNIFICANT CHANGE UP (ref 2–14)
NEUTROPHILS NFR BLD AUTO: 74.3 % — SIGNIFICANT CHANGE UP (ref 43–77)
PLATELET # BLD AUTO: 195 K/UL — SIGNIFICANT CHANGE UP (ref 150–400)
POTASSIUM SERPL-MCNC: 4.5 MMOL/L — SIGNIFICANT CHANGE UP (ref 3.5–5.3)
POTASSIUM SERPL-SCNC: 4.5 MMOL/L — SIGNIFICANT CHANGE UP (ref 3.5–5.3)
RBC # BLD: 5.14 M/UL — SIGNIFICANT CHANGE UP (ref 4.2–5.8)
RBC # FLD: 14.2 % — SIGNIFICANT CHANGE UP (ref 10.3–16.9)
SODIUM SERPL-SCNC: 142 MMOL/L — SIGNIFICANT CHANGE UP (ref 135–145)
WBC # BLD: 6.8 K/UL — SIGNIFICANT CHANGE UP (ref 3.8–10.5)
WBC # FLD AUTO: 6.8 K/UL — SIGNIFICANT CHANGE UP (ref 3.8–10.5)

## 2018-12-13 PROCEDURE — 93306 TTE W/DOPPLER COMPLETE: CPT | Mod: 26

## 2018-12-13 PROCEDURE — 99239 HOSP IP/OBS DSCHRG MGMT >30: CPT

## 2018-12-13 RX ORDER — ASPIRIN/CALCIUM CARB/MAGNESIUM 324 MG
1 TABLET ORAL
Qty: 30 | Refills: 11 | OUTPATIENT
Start: 2018-12-13 | End: 2019-12-07

## 2018-12-13 RX ORDER — CLOPIDOGREL BISULFATE 75 MG/1
1 TABLET, FILM COATED ORAL
Qty: 30 | Refills: 0 | OUTPATIENT
Start: 2018-12-13 | End: 2019-01-11

## 2018-12-13 RX ORDER — LISINOPRIL 2.5 MG/1
1 TABLET ORAL
Qty: 0 | Refills: 0 | COMMUNITY

## 2018-12-13 RX ORDER — ASPIRIN/CALCIUM CARB/MAGNESIUM 324 MG
1 TABLET ORAL
Qty: 0 | Refills: 0 | COMMUNITY

## 2018-12-13 RX ORDER — LANOLIN ALCOHOL/MO/W.PET/CERES
1 CREAM (GRAM) TOPICAL
Qty: 30 | Refills: 0 | OUTPATIENT
Start: 2018-12-13 | End: 2019-01-11

## 2018-12-13 RX ADMIN — AMLODIPINE BESYLATE 10 MILLIGRAM(S): 2.5 TABLET ORAL at 06:04

## 2018-12-13 RX ADMIN — CILOSTAZOL 100 MILLIGRAM(S): 100 TABLET ORAL at 11:19

## 2018-12-13 RX ADMIN — CLOPIDOGREL BISULFATE 75 MILLIGRAM(S): 75 TABLET, FILM COATED ORAL at 11:21

## 2018-12-13 RX ADMIN — Medication 81 MILLIGRAM(S): at 11:18

## 2018-12-13 NOTE — DISCHARGE NOTE ADULT - SECONDARY DIAGNOSIS.
Chronic systolic congestive heart failure Hyperlipidemia Essential hypertension ZARIA (acute kidney injury)

## 2018-12-13 NOTE — DISCUSSION/SUMMARY
[___ Week(s)] : [unfilled] week(s) [FreeTextEntry1] : 1. Proceed with attempt of iliac stent.\par 2. Would need more aggressive anti-platelet and cholesterol lowering treatment after the procedure.\par 3. Will arrange for outpatient carotid Duplex USG to rule out carotid stenosis after iliac procedure.\par

## 2018-12-13 NOTE — DISCHARGE NOTE ADULT - CARE PLAN
Principal Discharge DX:	PVD (peripheral vascular disease)  Goal:	Please continue aspirin 81 mg daily and plavix 75 mg daily.  Assessment and plan of treatment:	You underwent a peripheral angiogram and received a stent to the right  Secondary Diagnosis:	Chronic systolic congestive heart failure  Secondary Diagnosis:	Hyperlipidemia  Secondary Diagnosis:	Essential hypertension Principal Discharge DX:	PVD (peripheral vascular disease)  Goal:	Please continue aspirin 81 mg daily and plavix 75 mg daily.  Assessment and plan of treatment:	You underwent a peripheral angiogram and received a stent to the right external iliac artery. The procedure was done through the left groin.   You do not need to keep this area covered and you may shower.  Please avoid any heavy lifting  (no more than 3 to 5 lbs) or strenuous activity for five days.  If you develop any swelling, bleeding, hardening of the skin (hematoma formation), acute pain, numbness/tingling  in your leg please contact your doctor immediately or call our 24/7 line: [phone]    NEVER MISS A DOSE OF ASPIRIN OR PLAVIX; IF YOU DO, YOU ARE AT RISK OF YOUR STENT CLOSING AND HAVING A HEART ATTACK. DO NOT STOP THESE TWO MEDICATIONS UNLESS INSTRUCTED TO DO SO BY YOUR CARDIOLOGIST    Please follow up with Dr. Bo at your scheduled appointment next week  Secondary Diagnosis:	Chronic systolic congestive heart failure  Secondary Diagnosis:	Hyperlipidemia  Secondary Diagnosis:	Essential hypertension  Goal:	Please continue amlodipine 10 mg daily Principal Discharge DX:	PVD (peripheral vascular disease)  Goal:	Please continue aspirin 81 mg daily and plavix 75 mg daily for one month. Then aspirin 81 mg daily indefinitely  Assessment and plan of treatment:	You underwent a peripheral angiogram and received a stent to the right external iliac artery. The procedure was done through the left groin.   You do not need to keep this area covered and you may shower.  Please avoid any heavy lifting  (no more than 3 to 5 lbs) or strenuous activity for five days.  If you develop any swelling, bleeding, hardening of the skin (hematoma formation), acute pain, numbness/tingling  in your leg please contact your doctor immediately or call our 24/7 line: [phone]    NEVER MISS A DOSE OF ASPIRIN OR PLAVIX; IF YOU DO, YOU ARE AT RISK OF YOUR STENT CLOSING AND HAVING A HEART ATTACK. DO NOT STOP THESE TWO MEDICATIONS UNLESS INSTRUCTED TO DO SO BY YOUR CARDIOLOGIST    Please follow up with Dr. Bo at your scheduled appointment next week  Secondary Diagnosis:	Chronic systolic congestive heart failure  Goal:	Please follow up with your cardiologist Dr. Bo at scheduled appointment next week  Assessment and plan of treatment:	You have a history of weakened heart muscle called congestive heart failure.   Please make sure you follow up with your cardiologist within one week of discharge.   Please weigh yourself daily: if you have gained more than 2-3 lbs in one day or 5 lbs in one week contact your doctor immediately as you may be retaining water weight  In addition, restrict your salt intake to less than 2 grams a day  If you develop worsening shortening of breath, leg swelling, fatigue, chest pain, difficulty sleeping at night due to shortness of breath, contact your cardiologist immediately.  Secondary Diagnosis:	Hyperlipidemia  Goal:	Please continue atorvastatin 20 mg daily  Secondary Diagnosis:	Essential hypertension  Goal:	Please continue amlodipine 10 mg daily. Please HOLD lisinopril 40 mg daily and Dr. Bo will discuss when to restart as an outpatient.  Secondary Diagnosis:	ZARIA (acute kidney injury)  Goal:	Please follow up with Dr. Bo at your scheduled appointment next week  Assessment and plan of treatment:	There was an increase in your creatinine (kidney function lab) in response to the contrast dye given during the cardiac catherization in November and the peripheral catheterization yesterday. Dr. Bo will re-check the labs at your follow up appointment. You home medication lisinopril is being held and will be restarted as an outpatient when Dr. Bo deems it appropriate.

## 2018-12-13 NOTE — DISCHARGE NOTE ADULT - PLAN OF CARE
Please continue aspirin 81 mg daily and plavix 75 mg daily. You underwent a peripheral angiogram and received a stent to the right You underwent a peripheral angiogram and received a stent to the right external iliac artery. The procedure was done through the left groin.   You do not need to keep this area covered and you may shower.  Please avoid any heavy lifting  (no more than 3 to 5 lbs) or strenuous activity for five days.  If you develop any swelling, bleeding, hardening of the skin (hematoma formation), acute pain, numbness/tingling  in your leg please contact your doctor immediately or call our 24/7 line: [phone]    NEVER MISS A DOSE OF ASPIRIN OR PLAVIX; IF YOU DO, YOU ARE AT RISK OF YOUR STENT CLOSING AND HAVING A HEART ATTACK. DO NOT STOP THESE TWO MEDICATIONS UNLESS INSTRUCTED TO DO SO BY YOUR CARDIOLOGIST    Please follow up with Dr. Bo at your scheduled appointment next week Please continue amlodipine 10 mg daily Please continue aspirin 81 mg daily and plavix 75 mg daily for one month. Then aspirin 81 mg daily indefinitely Please follow up with your cardiologist Dr. Bo at scheduled appointment next week You have a history of weakened heart muscle called congestive heart failure.   Please make sure you follow up with your cardiologist within one week of discharge.   Please weigh yourself daily: if you have gained more than 2-3 lbs in one day or 5 lbs in one week contact your doctor immediately as you may be retaining water weight  In addition, restrict your salt intake to less than 2 grams a day  If you develop worsening shortening of breath, leg swelling, fatigue, chest pain, difficulty sleeping at night due to shortness of breath, contact your cardiologist immediately. Please continue atorvastatin 20 mg daily Please continue amlodipine 10 mg daily. Please HOLD lisinopril 40 mg daily and Dr. Bo will discuss when to restart as an outpatient. Please follow up with Dr. Bo at your scheduled appointment next week There was an increase in your creatinine (kidney function lab) in response to the contrast dye given during the cardiac catherization in November and the peripheral catheterization yesterday. Dr. Bo will re-check the labs at your follow up appointment. You home medication lisinopril is being held and will be restarted as an outpatient when Dr. Bo deems it appropriate.

## 2018-12-13 NOTE — REVIEW OF SYSTEMS
[Shortness Of Breath] : shortness of breath [Leg Claudication] : intermittent leg claudication [Fever] : no fever [Chills] : no chills [Blurry Vision] : no blurred vision [Seeing Double (Diplopia)] : no diplopia [Chest Pain] : no chest pain [Palpitations] : no palpitations [Cough] : no cough [Wheezing] : no wheezing [Abdominal Pain] : no abdominal pain [Nausea] : no nausea [Change In The Stool] : no change in stool [Urinary Frequency] : no change in urinary frequency [Hematuria] : no hematuria [Nocturia] : no nocturia [Joint Pain] : no joint pain [Joint Swelling] : no joint swelling [Skin: A Rash] : no rash: [Change In Color Of Skin] : change in skin color [Skin Lesions] : no skin lesions [Dizziness] : no dizziness [Convulsions] : no convulsions [Confusion] : no confusion was observed [Anxiety] : no anxiety [Excessive Thirst] : no polydipsia [Easy Bleeding] : no tendency for easy bleeding [Easy Bruising] : no tendency for easy bruising

## 2018-12-13 NOTE — PHYSICAL EXAM
[General Appearance - Well Developed] : well developed [General Appearance - Well Nourished] : well nourished [Normal Conjunctiva] : the conjunctiva exhibited no abnormalities [Normal Oral Mucosa] : normal oral mucosa [JVD Elevated _____cm] : JVD elevated [unfilled] ~U cm above clavicle [Heart Rate And Rhythm] : heart rate and rhythm were normal [Heart Sounds] : normal S1 and S2 [Murmurs] : no murmurs present [Auscultation Breath Sounds / Voice Sounds] : lungs were clear to auscultation bilaterally [Bowel Sounds] : normal bowel sounds [Abdomen Mass (___ Cm)] : no abdominal mass palpated [Abnormal Walk] : normal gait [Nail Clubbing] : no clubbing of the fingernails [Cyanosis, Localized] : no localized cyanosis [] : no ischemic changes [Skin Color & Pigmentation] : normal skin color and pigmentation [No Venous Stasis] : no venous stasis [No Skin Ulcers] : no skin ulcer [Oriented To Time, Place, And Person] : oriented to person, place, and time [Affect] : the affect was normal [Mood] : the mood was normal [FreeTextEntry1] : 2+ left lower extremity pulses, nonpalpable right lower extremity pulses

## 2018-12-13 NOTE — DISCHARGE NOTE ADULT - CARE PROVIDER_API CALL
Macy Bo), Cardiovascular Disease; Internal Medicine  [address] [address]  Phone: [phone]  Fax: [phone]

## 2018-12-13 NOTE — REASON FOR VISIT
[Consultation] : a consultation regarding [FreeTextEntry2] : severe lower extremity claudication with known iliac occlusion

## 2018-12-13 NOTE — DISCHARGE NOTE ADULT - MEDICATION SUMMARY - MEDICATIONS TO TAKE
I will START or STAY ON the medications listed below when I get home from the hospital:    aspirin 81 mg oral delayed release tablet  -- 1 tab(s) by mouth once a day  -- Indication: For PVD (peripheral vascular disease), keeps stent open    cloNIDine 0.1 mg oral tablet  -- 1 tab(s) by mouth 2 times a day  -- Indication: For blood pressure    atorvastatin 20 mg oral tablet  -- 1 tab(s) by mouth once a day  -- Indication: For Cholesterol    clopidogrel 75 mg oral tablet  -- 1 tab(s) by mouth once a day  -- Indication: For PVD (peripheral vascular disease), keeps stent open    amLODIPine 10 mg oral tablet  -- 1 tab(s) by mouth once a day  -- Indication: For blood pressure    cilostazol 100 mg oral tablet  -- 1 tab(s) by mouth 2 times a day  -- Indication: For PVD (peripheral vascular disease)    melatonin 5 mg oral tablet  -- 1 tab(s) by mouth once a day (at bedtime), As needed, Insomnia  -- Indication: For sleep

## 2018-12-13 NOTE — ASSESSMENT
[FreeTextEntry1] : Severe (Bracken Class III) life-style limiting claudication not responding to medical treatment with known right external iliac occlusion (TASC B).  Treatment options including continued medical therapy, supervised exercise program, iliac stenting and surgical bypass discussed with patient in detail, risk and benefit of each approach extensively reviewed.  The patient definitely chooses stenting over surgery given his comorbidities.  He also chooses stent with supervised exercise later over supervised exercise first before deciding on stent, as he need more timely symptomatic relief because of his daily life and job requirements.

## 2018-12-13 NOTE — DISCHARGE NOTE ADULT - HOSPITAL COURSE
64 y/o Male, former heavy EtoH user and recreational Marijuana user (last use >2 months ago), with FHx of CAD and PMHx of HTN, HLD, DM, Chronic Systolic CHF, known CAD s/p 4V CABG 2011 @ [facility] with most recent Cardiac Cath @ [facility] 11/30/2018, and known PVD with Aortogram/Iliac Angiography 11/30/2018:  of the Common Iliac Artery who presented to his Cardiologist, Dr. Haywood, c/o worsening b/l lower extremity pain R>L after walking 1-2 blocks or walking up stairs. The patient states that the pain is only better at rest, but comes back when he starts walking again. The pain is constant, 7/10, no radiation. Patient denies LE edema, ulcers in legs/feet, discoloration of LEs, change of hair pattern, CP, SOB, dizziness or syncope. Given patient's risk factors, known PVD, abnormal peripheral Angiography, and worsening LE claudication, patient is now referred for staged Peripheral PCI. Pt now s/p peripheral cath with PTCA/BMS R external iliac artery, L 7FA sheath, EF 27% by ECHO. Pt received 1/2 NS 200cc bolus pre-cath and hydrated with 1/2 NS @ 50cc/hr prior to cath as per Dr. Agosto. Pt received 100mLs contrast during procedure, will hydrate post-cath 1/2 NS @ 50cc/hr x 6 hours as pt with Cr increase from 0.82 prior to dx cardiac cath on 11/30 to 1.23 12/12 prior to peripheral cath. Pt admitted to 5 uris overnight for observation. VSS, no events on telemetry. Cr 1.44, increased from 1.23 prior to cath. As discussed with outpt cardiologist Dr. Dominguez..... Repeat ECHO showing.....   ASA/Plavix x1 month, then ASA 81mg indefinitely. 64 y/o Male, former heavy EtoH user and recreational Marijuana user (last use >2 months ago), with FHx of CAD and PMHx of HTN, HLD, DM, Chronic Systolic CHF, known CAD s/p 4V CABG 2011 @ [facility] with most recent Cardiac Cath @ [facility] 11/30/2018, and known PVD with Aortogram/Iliac Angiography 11/30/2018:  of the Common Iliac Artery who presented to his Cardiologist, Dr. Haywood, c/o worsening b/l lower extremity pain R>L after walking 1-2 blocks or walking up stairs. The patient states that the pain is only better at rest, but comes back when he starts walking again. The pain is constant, 7/10, no radiation. Patient denies LE edema, ulcers in legs/feet, discoloration of LEs, change of hair pattern, CP, SOB, dizziness or syncope. Given patient's risk factors, known PVD, abnormal peripheral Angiography, and worsening LE claudication, patient is now referred for staged Peripheral PCI. Pt now s/p peripheral cath with PTCA/BMS R external iliac artery, L 7FA sheath, EF 27% by ECHO. Pt received 1/2 NS 200cc bolus pre-cath and hydrated with 1/2 NS @ 50cc/hr prior to cath as per Dr. Agosto. Pt received 100mLs contrast during procedure, will hydrate post-cath 1/2 NS @ 50cc/hr x 6 hours as pt with Cr increase from 0.82 prior to dx cardiac cath on 11/30 to 1.23 12/12 prior to peripheral cath. Pt admitted to 5 uris overnight for observation. VSS, no events on telemetry. Cr 1.44, increased from 1.23 prior to cath. As discussed with outpt cardiologist Dr. Bo lisinopril will be discontinued and will restart as an outpt .Repeat ECHO performed.  L groin site stable, no hematoma, distal pulses intact. ASA/Plavix x1 month, then ASA 81mg indefinitely, atorvastatin 20 mg.  Pt deemed stable for discharge per Dr. Driscoll and will follow up with Dr. Bo next week .

## 2018-12-13 NOTE — DISCHARGE NOTE ADULT - PATIENT PORTAL LINK FT
You can access the [facility], offered by [facility], by registering with the following website: [email or web address]

## 2018-12-17 DIAGNOSIS — I73.9 PERIPHERAL VASCULAR DISEASE, UNSPECIFIED: ICD-10-CM

## 2018-12-17 DIAGNOSIS — R73.03 PREDIABETES: ICD-10-CM

## 2018-12-17 DIAGNOSIS — I70.92 CHRONIC TOTAL OCCLUSION OF ARTERY OF THE EXTREMITIES: ICD-10-CM

## 2018-12-17 DIAGNOSIS — E78.5 HYPERLIPIDEMIA, UNSPECIFIED: ICD-10-CM

## 2018-12-17 DIAGNOSIS — N17.9 ACUTE KIDNEY FAILURE, UNSPECIFIED: ICD-10-CM

## 2018-12-17 DIAGNOSIS — I50.22 CHRONIC SYSTOLIC (CONGESTIVE) HEART FAILURE: ICD-10-CM

## 2018-12-17 DIAGNOSIS — I13.0 HYPERTENSIVE HEART AND CHRONIC KIDNEY DISEASE WITH HEART FAILURE AND STAGE 1 THROUGH STAGE 4 CHRONIC KIDNEY DISEASE, OR UNSPECIFIED CHRONIC KIDNEY DISEASE: ICD-10-CM

## 2018-12-17 DIAGNOSIS — I25.82 CHRONIC TOTAL OCCLUSION OF CORONARY ARTERY: ICD-10-CM

## 2018-12-17 DIAGNOSIS — M19.072 PRIMARY OSTEOARTHRITIS, LEFT ANKLE AND FOOT: ICD-10-CM

## 2018-12-17 DIAGNOSIS — M19.071 PRIMARY OSTEOARTHRITIS, RIGHT ANKLE AND FOOT: ICD-10-CM

## 2018-12-17 DIAGNOSIS — I25.10 ATHEROSCLEROTIC HEART DISEASE OF NATIVE CORONARY ARTERY WITHOUT ANGINA PECTORIS: ICD-10-CM

## 2018-12-17 DIAGNOSIS — I25.810 ATHEROSCLEROSIS OF CORONARY ARTERY BYPASS GRAFT(S) WITHOUT ANGINA PECTORIS: ICD-10-CM

## 2018-12-17 DIAGNOSIS — N18.2 CHRONIC KIDNEY DISEASE, STAGE 2 (MILD): ICD-10-CM

## 2018-12-19 ENCOUNTER — RESULT CHARGE (OUTPATIENT)
Age: 63
End: 2018-12-19

## 2018-12-20 ENCOUNTER — APPOINTMENT (OUTPATIENT)
Dept: HEART AND VASCULAR | Facility: CLINIC | Age: 63
End: 2018-12-20
Payer: MEDICARE

## 2018-12-20 VITALS
TEMPERATURE: 98.8 F | HEIGHT: 68 IN | DIASTOLIC BLOOD PRESSURE: 80 MMHG | OXYGEN SATURATION: 98 % | BODY MASS INDEX: 28.49 KG/M2 | WEIGHT: 187.99 LBS | HEART RATE: 87 BPM | SYSTOLIC BLOOD PRESSURE: 118 MMHG

## 2018-12-20 VITALS — DIASTOLIC BLOOD PRESSURE: 80 MMHG | SYSTOLIC BLOOD PRESSURE: 120 MMHG

## 2018-12-20 DIAGNOSIS — Z82.49 FAMILY HISTORY OF ISCHEMIC HEART DISEASE AND OTHER DISEASES OF THE CIRCULATORY SYSTEM: ICD-10-CM

## 2018-12-20 PROCEDURE — 99214 OFFICE O/P EST MOD 30 MIN: CPT

## 2018-12-20 RX ORDER — ASPIRIN 81 MG/1
81 TABLET ORAL
Refills: 0 | Status: ACTIVE | COMMUNITY

## 2018-12-20 RX ORDER — ASPIRIN 325 MG/1
325 TABLET, FILM COATED ORAL DAILY
Refills: 0 | Status: DISCONTINUED | COMMUNITY
End: 2018-12-20

## 2018-12-27 PROCEDURE — 82962 GLUCOSE BLOOD TEST: CPT

## 2018-12-27 PROCEDURE — 85025 COMPLETE CBC W/AUTO DIFF WBC: CPT

## 2018-12-27 PROCEDURE — 80048 BASIC METABOLIC PNL TOTAL CA: CPT

## 2018-12-27 PROCEDURE — 82553 CREATINE MB FRACTION: CPT

## 2018-12-27 PROCEDURE — C1894: CPT

## 2018-12-27 PROCEDURE — 75630 X-RAY AORTA LEG ARTERIES: CPT

## 2018-12-27 PROCEDURE — 36415 COLL VENOUS BLD VENIPUNCTURE: CPT

## 2018-12-27 PROCEDURE — C1725: CPT

## 2018-12-27 PROCEDURE — C1876: CPT

## 2018-12-27 PROCEDURE — 85730 THROMBOPLASTIN TIME PARTIAL: CPT

## 2018-12-27 PROCEDURE — 85610 PROTHROMBIN TIME: CPT

## 2018-12-27 PROCEDURE — 86140 C-REACTIVE PROTEIN: CPT

## 2018-12-27 PROCEDURE — 37221: CPT

## 2018-12-27 PROCEDURE — 82550 ASSAY OF CK (CPK): CPT

## 2018-12-27 PROCEDURE — 80061 LIPID PANEL: CPT

## 2018-12-27 PROCEDURE — C1887: CPT

## 2018-12-27 PROCEDURE — C1769: CPT

## 2018-12-27 PROCEDURE — 37184 PRIM ART M-THRMBC 1ST VSL: CPT

## 2018-12-27 PROCEDURE — 83036 HEMOGLOBIN GLYCOSYLATED A1C: CPT

## 2018-12-27 PROCEDURE — 80053 COMPREHEN METABOLIC PANEL: CPT

## 2018-12-27 PROCEDURE — 83735 ASSAY OF MAGNESIUM: CPT

## 2018-12-27 PROCEDURE — 93306 TTE W/DOPPLER COMPLETE: CPT

## 2018-12-27 PROCEDURE — 75710 ARTERY X-RAYS ARM/LEG: CPT

## 2018-12-27 PROCEDURE — 93005 ELECTROCARDIOGRAM TRACING: CPT

## 2019-01-07 ENCOUNTER — APPOINTMENT (OUTPATIENT)
Dept: HEART AND VASCULAR | Facility: CLINIC | Age: 64
End: 2019-01-07
Payer: MEDICARE

## 2019-01-07 PROBLEM — I73.9 PERIPHERAL VASCULAR DISEASE, UNSPECIFIED: Chronic | Status: ACTIVE | Noted: 2018-12-11

## 2019-01-07 PROCEDURE — 93922 UPR/L XTREMITY ART 2 LEVELS: CPT

## 2019-01-07 PROCEDURE — 93880 EXTRACRANIAL BILAT STUDY: CPT

## 2019-01-09 ENCOUNTER — OUTPATIENT (OUTPATIENT)
Dept: OUTPATIENT SERVICES | Facility: HOSPITAL | Age: 64
LOS: 1 days | End: 2019-01-09
Payer: MEDICARE

## 2019-01-09 ENCOUNTER — APPOINTMENT (OUTPATIENT)
Dept: CT IMAGING | Facility: HOSPITAL | Age: 64
End: 2019-01-09
Payer: MEDICARE

## 2019-01-09 DIAGNOSIS — Z95.1 PRESENCE OF AORTOCORONARY BYPASS GRAFT: Chronic | ICD-10-CM

## 2019-01-09 PROCEDURE — 70498 CT ANGIOGRAPHY NECK: CPT | Mod: 26

## 2019-01-09 PROCEDURE — 70498 CT ANGIOGRAPHY NECK: CPT

## 2019-01-14 ENCOUNTER — APPOINTMENT (OUTPATIENT)
Dept: HEART AND VASCULAR | Facility: CLINIC | Age: 64
End: 2019-01-14
Payer: MEDICARE

## 2019-01-14 VITALS
SYSTOLIC BLOOD PRESSURE: 102 MMHG | TEMPERATURE: 98.5 F | OXYGEN SATURATION: 98 % | HEIGHT: 68 IN | BODY MASS INDEX: 30.01 KG/M2 | WEIGHT: 198 LBS | DIASTOLIC BLOOD PRESSURE: 80 MMHG | HEART RATE: 73 BPM

## 2019-01-14 DIAGNOSIS — Z86.79 PERSONAL HISTORY OF OTHER DISEASES OF THE CIRCULATORY SYSTEM: ICD-10-CM

## 2019-01-14 DIAGNOSIS — R09.89 OTHER SPECIFIED SYMPTOMS AND SIGNS INVOLVING THE CIRCULATORY AND RESPIRATORY SYSTEMS: ICD-10-CM

## 2019-01-14 DIAGNOSIS — Z86.39 PERSONAL HISTORY OF OTHER ENDOCRINE, NUTRITIONAL AND METABOLIC DISEASE: ICD-10-CM

## 2019-01-14 PROCEDURE — 99215 OFFICE O/P EST HI 40 MIN: CPT

## 2019-01-14 NOTE — REVIEW OF SYSTEMS
[Dyspnea on exertion] : dyspnea during exertion [Fever] : no fever [Chills] : no chills [Blurry Vision] : no blurred vision [Chest Pain] : no chest pain [Leg Claudication] : no intermittent leg claudication [Cough] : no cough [Wheezing] : no wheezing [Abdominal Pain] : no abdominal pain [Urinary Frequency] : no change in urinary frequency [Dizziness] : no dizziness [Numbness (Hypesthesia)] : no numbness [Tingling (Paresthesia)] : no tingling [No Sensation] : denied anesthesia [Facial Weakness] : no facial weakness [Arm Weakness] : no arm weakness [Hand Weakness] : no hand weakness [Leg Weakness] : no leg weakness [Poor Coordination] : good coordination

## 2019-01-14 NOTE — DISCUSSION/SUMMARY
Pre-Visit Chart Review  For Appointment Scheduled on 05/21/18    Health Maintenance Due   Topic Date Due    Mammogram  04/11/2018                      [FreeTextEntry1] : 1. Present clinical data to [facility] core lab and enrollment committee for review.\par 2. If the patient qualifies for [facility], will proceed with GIANCARLO within the next month.\par 3. If he does not qualifies for [facility], will refer him for CEA.\par 4. Will discuss with his urologist regarding timing of prostate bx and potential surgery.  Severe carotid stenosis may poses risk to general anesthesia, if it is required for prostate bx or surgery.  Ideally, it should be postponed until after carotid revascularization and cessation of dual antiplatelet therapy, which is about 2 months from now.

## 2019-01-14 NOTE — ASSESSMENT
[FreeTextEntry1] : Carotid Stenosis: Severe ALEX stenosis, Asymptomatic, in patient at high risk for CEA, because of ischemic DCM with EF <=35% with NYHA Class II-III CHF symptoms.  Treatment options including continued medical treatment, CEA and GIANCARLO discussed with patient in detailed.  Benefits and risks for different approaches extensively explained to the patient.  Specifically there is an upfront risk of dorothy-procedural morbidity and mortality associated with either CEA or GIANCARLO, with clinical trials showing numerically (but not statistically) higher stroke rate for GIANCARLO and higher MI rate for CEA.  He is probably at higher risk for cardiac complication because of CAD and ischemic DCM with low EF.  Because there is no current CMS payment for GIANCARLO, the stent option is only available through company sponsored device trails.  The trial available at [facility] is [facility] sponsored by [email or web address] of the Roadsaver stent with Nanoparasol distal protection.  The patient was told that these are non-FDA approved devices under Phase III trial clinical investigation.  The patient expressed understanding of the condition, treatment options and [facility] trial.  He asked many questions.  After careful consideration, he said he prefers GIANCARLO and would like apply to be enrolled in [facility] trial.  He had meeting with [facility] trial coordinator in the office and asked further questions.  He signed the research consent.

## 2019-01-14 NOTE — PHYSICAL EXAM
[General Appearance - Well Developed] : well developed [General Appearance - Well Nourished] : well nourished [Normal Conjunctiva] : the conjunctiva exhibited no abnormalities [Normal Oral Mucosa] : normal oral mucosa [JVD Elevated _____cm] : JVD elevated [unfilled] ~U cm above clavicle [Auscultation Breath Sounds / Voice Sounds] : lungs were clear to auscultation bilaterally [Heart Sounds] : normal S1 and S2 [Murmurs] : no murmurs present [Edema] : no peripheral edema present [Abdomen Soft] : soft [Abdomen Tenderness] : non-tender [Abnormal Walk] : normal gait [Nail Clubbing] : no clubbing of the fingernails [Cyanosis, Localized] : no localized cyanosis [Skin Color & Pigmentation] : normal skin color and pigmentation [Oriented To Time, Place, And Person] : oriented to person, place, and time [FreeTextEntry1] : CNS II-XII intact, no focal sensory or motor deficit

## 2019-01-14 NOTE — HISTORY OF PRESENT ILLNESS
[FreeTextEntry1] : Found to have severe (>80%) carotid stenosis of right ICA by Duplex USG and confirmed by CTA.  Patient denies h/o recent speech or visual changes, focal weakness or numbness.  No further claudication of RLE after iliac stent, but continue to have BALDWIN after 2-3 blocks walking on the street and severe SOB after running about 10 yards to catch a bus today.  No PND, orthopnea or LE edema.  Atorvastatin was increased to 40 mg po qd but Lisinopril was stopped by cardiology. Apparently was found to have PSA of 22 and is scheduled for prostate bx on 2/20/19.

## 2019-01-25 VITALS
RESPIRATION RATE: 18 BRPM | SYSTOLIC BLOOD PRESSURE: 135 MMHG | HEIGHT: 70 IN | HEART RATE: 73 BPM | WEIGHT: 195.11 LBS | OXYGEN SATURATION: 97 % | DIASTOLIC BLOOD PRESSURE: 86 MMHG | TEMPERATURE: 98 F

## 2019-01-25 NOTE — H&P ADULT - HISTORY OF PRESENT ILLNESS
*** CONFIRM MEDS    64 y.o Male, former heavy EtoH user and recreational Marijuana user (last use >2 months ago), with FHx of CAD and PMHx of HTN, HLD, DM, Chronic Systolic CHF (EF 30% by cath 11/2018), CKD Stage 3, Known CAD s/p 4V CABG 2011 (LIMA-LAD, free MIGUEL-Diag 1, SVG-OM1, SVG-RCA) @ [facility] with most recent Cardiac Cath @ [facility] 11/30/2018 revealing 3VCAD; mLAD , Diag 1 , 70-80% OM1, mRCa , patent grafts X 4), and known PVD s/p PTCA/BMS R external iliac artery @ [facility] on 12/12/18, who was recently found to have a R carotid bruit on routine exam.  Subsequently, pt underwent CTA of neck on 01/09/19 which revealed greater than 70% right carotis stenosis.  Mild left carotid artery stenosis.  He remains asymptomatic.  Deneis any hc of CVA, TIA, blurry vision, weaknes or numbness on one side of face, arm/leg, or body, slurred speech, unsteady gate, dizziness, confusion, or difficulty swallowing.         Given patient's risk factors, known Atherosclerosis, and R Carotid Stenosis, pt is now referred to [facility] for recommended Carotid Angiogram with possible intervention. 64 y.o Male, former heavy EtoH user and recreational Marijuana user (last use >2 months ago), with FHx of CAD and PMHx of HTN, HLD, DM, Chronic Systolic CHF (EF 30% by cath 11/2018), CKD Stage 3, Known CAD s/p 4V CABG 2011 (LIMA-LAD, free MIGUEL-Diag 1, SVG-OM1, SVG-RCA) @ [facility] with most recent Cardiac Cath @ [facility] 11/30/2018 revealing 3VCAD; mLAD , Diag 1 , 70-80% OM1, mRCa , patent grafts X 4), and known PVD s/p PTCA/BMS R external iliac artery @ [facility] on 12/12/18, who was recently found to have a R carotid bruit on routine exam.  Subsequently, pt underwent CTA of neck on 01/09/19 which revealed greater than 70% right carotid stenosis.  Mild left carotid artery stenosis.  He remains asymptomatic.  Deneis any hc of CVA, TIA, blurry vision, weaknes or numbness on one side of face, arm/leg, or body, slurred speech, unsteady gate, dizziness, confusion, or difficulty swallowing.         Given patient's risk factors, known Atherosclerosis, and R Carotid Stenosis, pt is now referred to [facility] for recommended Carotid Angiogram with possible intervention.

## 2019-01-25 NOTE — H&P ADULT - RS GEN PE MLT RESP DETAILS PC
no wheezes/no rales/no rhonchi/good air movement/clear to auscultation bilaterally/breath sounds equal/no intercostal retractions

## 2019-01-25 NOTE — H&P ADULT - ASSESSMENT
64 y.o Male, former heavy EtoH user and recreational Marijuana user (last use >2 months ago), with FHx of CAD and PMHx of HTN, HLD, DM, Chronic Systolic CHF (EF 30% by cath 11/2018), CKD Stage 3, Known CAD s/p 4V CABG 2011 (LIMA-LAD, free MIGUEL-Diag 1, SVG-OM1, SVG-RCA) @ [facility] with most recent Cardiac Cath @ [facility] 11/30/2018 revealing 3VCAD; mLAD , Diag 1 , 70-80% OM1, mRCa , patent grafts X 4), and known PVD s/p PTCA/BMS R external iliac artery @ [facility] on 12/12/18, who was recently found to have a R carotid bruit on routine exam. Given patient's risk factors, known Atherosclerosis, and R Carotid Stenosis, pt is now referred to [facility] for recommended Carotid Angiogram with possible intervention.     ASA: II    Mallampati: II    Precath/consented  Loaded with Plavix 600mg PO x 1 and daily ASA 81mg PO x 1 today prior to cath as d/w Dr. Agosto  IVF 1/2 NS @ 50cc/hr

## 2019-01-25 NOTE — H&P ADULT - NSHPLABSRESULTS_GEN_ALL_CORE
15.1   8.6   )-----------( 192      ( 28 Jan 2019 07:57 )             46.6   PT/INR - ( 28 Jan 2019 07:57 )   PT: 12.2 sec;   INR: 1.08          PTT - ( 28 Jan 2019 07:57 )  PTT:30.0 sec  01-28    139  |  104  |  26<H>  ----------------------------<  103<H>  3.9   |  25  |  1.39<H>    Ca    9.2      28 Jan 2019 07:57    TPro  7.4  /  Alb  4.3  /  TBili  0.6  /  DBili  x   /  AST  26  /  ALT  33  /  AlkPhos  68  01-28 15.1   8.6   )-----------( 192      ( 28 Jan 2019 07:57 )             46.6   PT/INR - ( 28 Jan 2019 07:57 )   PT: 12.2 sec;   INR: 1.08          PTT - ( 28 Jan 2019 07:57 )  PTT:30.0 sec  01-28    139  |  104  |  26<H>  ----------------------------<  103<H>  3.9   |  25  |  1.39<H>    Ca    9.2      28 Jan 2019 07:57    TPro  7.4  /  Alb  4.3  /  TBili  0.6  /  DBili  x   /  AST  26  /  ALT  33  /  AlkPhos  68  01-28    EKG: SR with PACs, TWI in V6, I, aVL and Q waves in III and aVF

## 2019-01-25 NOTE — H&P ADULT - FAMILY HISTORY
Uncle  Still living? Unknown  Family history of early CAD, Age at diagnosis: Age Unknown     Mother  Still living? Unknown  Family history of MI (myocardial infarction), Age at diagnosis: Age Unknown

## 2019-01-28 ENCOUNTER — INPATIENT (INPATIENT)
Facility: HOSPITAL | Age: 64
LOS: 0 days | Discharge: HOME | DRG: 38 | End: 2019-01-29
Attending: INTERNAL MEDICINE | Admitting: INTERNAL MEDICINE
Payer: COMMERCIAL

## 2019-01-28 DIAGNOSIS — Z95.1 PRESENCE OF AORTOCORONARY BYPASS GRAFT: Chronic | ICD-10-CM

## 2019-01-28 LAB
ALBUMIN SERPL ELPH-MCNC: 4.3 G/DL — SIGNIFICANT CHANGE UP (ref 3.3–5)
ALP SERPL-CCNC: 68 U/L — SIGNIFICANT CHANGE UP (ref 40–120)
ALT FLD-CCNC: 33 U/L — SIGNIFICANT CHANGE UP (ref 10–45)
ANION GAP SERPL CALC-SCNC: 10 MMOL/L — SIGNIFICANT CHANGE UP (ref 5–17)
APTT BLD: 30 SEC — SIGNIFICANT CHANGE UP (ref 27.5–36.3)
AST SERPL-CCNC: 26 U/L — SIGNIFICANT CHANGE UP (ref 10–40)
BASOPHILS NFR BLD AUTO: 0.5 % — SIGNIFICANT CHANGE UP (ref 0–2)
BILIRUB SERPL-MCNC: 0.6 MG/DL — SIGNIFICANT CHANGE UP (ref 0.2–1.2)
BUN SERPL-MCNC: 26 MG/DL — HIGH (ref 7–23)
CALCIUM SERPL-MCNC: 9.2 MG/DL — SIGNIFICANT CHANGE UP (ref 8.4–10.5)
CHLORIDE SERPL-SCNC: 104 MMOL/L — SIGNIFICANT CHANGE UP (ref 96–108)
CHOLEST SERPL-MCNC: 132 MG/DL — SIGNIFICANT CHANGE UP (ref 10–199)
CO2 SERPL-SCNC: 25 MMOL/L — SIGNIFICANT CHANGE UP (ref 22–31)
CREAT SERPL-MCNC: 1.39 MG/DL — HIGH (ref 0.5–1.3)
EOSINOPHIL NFR BLD AUTO: 2.2 % — SIGNIFICANT CHANGE UP (ref 0–6)
GLUCOSE SERPL-MCNC: 103 MG/DL — HIGH (ref 70–99)
HBA1C BLD-MCNC: 5.9 % — HIGH (ref 4–5.6)
HCT VFR BLD CALC: 46.6 % — SIGNIFICANT CHANGE UP (ref 39–50)
HDLC SERPL-MCNC: 38 MG/DL — LOW
HGB BLD-MCNC: 15.1 G/DL — SIGNIFICANT CHANGE UP (ref 13–17)
INR BLD: 1.08 — SIGNIFICANT CHANGE UP (ref 0.88–1.16)
LIPID PNL WITH DIRECT LDL SERPL: 69 MG/DL — SIGNIFICANT CHANGE UP
LYMPHOCYTES # BLD AUTO: 23.2 % — SIGNIFICANT CHANGE UP (ref 13–44)
MCHC RBC-ENTMCNC: 27.4 PG — SIGNIFICANT CHANGE UP (ref 27–34)
MCHC RBC-ENTMCNC: 32.4 G/DL — SIGNIFICANT CHANGE UP (ref 32–36)
MCV RBC AUTO: 84.4 FL — SIGNIFICANT CHANGE UP (ref 80–100)
MONOCYTES NFR BLD AUTO: 12.2 % — SIGNIFICANT CHANGE UP (ref 2–14)
NEUTROPHILS NFR BLD AUTO: 61.9 % — SIGNIFICANT CHANGE UP (ref 43–77)
PLATELET # BLD AUTO: 192 K/UL — SIGNIFICANT CHANGE UP (ref 150–400)
POTASSIUM SERPL-MCNC: 3.9 MMOL/L — SIGNIFICANT CHANGE UP (ref 3.5–5.3)
POTASSIUM SERPL-SCNC: 3.9 MMOL/L — SIGNIFICANT CHANGE UP (ref 3.5–5.3)
PROT SERPL-MCNC: 7.4 G/DL — SIGNIFICANT CHANGE UP (ref 6–8.3)
PROTHROM AB SERPL-ACNC: 12.2 SEC — SIGNIFICANT CHANGE UP (ref 10–12.9)
RBC # BLD: 5.52 M/UL — SIGNIFICANT CHANGE UP (ref 4.2–5.8)
RBC # FLD: 15.1 % — SIGNIFICANT CHANGE UP (ref 10.3–16.9)
SODIUM SERPL-SCNC: 139 MMOL/L — SIGNIFICANT CHANGE UP (ref 135–145)
TOTAL CHOLESTEROL/HDL RATIO MEASUREMENT: 3.5 RATIO — SIGNIFICANT CHANGE UP (ref 3.4–9.6)
TRIGL SERPL-MCNC: 124 MG/DL — SIGNIFICANT CHANGE UP (ref 10–149)
WBC # BLD: 8.6 K/UL — SIGNIFICANT CHANGE UP (ref 3.8–10.5)
WBC # FLD AUTO: 8.6 K/UL — SIGNIFICANT CHANGE UP (ref 3.8–10.5)

## 2019-01-28 PROCEDURE — 93010 ELECTROCARDIOGRAM REPORT: CPT

## 2019-01-28 PROCEDURE — 37215 TRANSCATH STENT CCA W/EPS: CPT

## 2019-01-28 RX ORDER — SODIUM CHLORIDE 9 MG/ML
500 INJECTION INTRAMUSCULAR; INTRAVENOUS; SUBCUTANEOUS
Qty: 0 | Refills: 0 | Status: DISCONTINUED | OUTPATIENT
Start: 2019-01-28 | End: 2019-01-29

## 2019-01-28 RX ORDER — ASPIRIN/CALCIUM CARB/MAGNESIUM 324 MG
81 TABLET ORAL ONCE
Qty: 0 | Refills: 0 | Status: COMPLETED | OUTPATIENT
Start: 2019-01-28 | End: 2019-01-28

## 2019-01-28 RX ORDER — ATROPINE SULFATE 0.1 MG/ML
0.5 SYRINGE (ML) INJECTION ONCE
Qty: 0 | Refills: 0 | Status: COMPLETED | OUTPATIENT
Start: 2019-01-28 | End: 2019-01-28

## 2019-01-28 RX ORDER — CLOPIDOGREL BISULFATE 75 MG/1
600 TABLET, FILM COATED ORAL ONCE
Qty: 0 | Refills: 0 | Status: COMPLETED | OUTPATIENT
Start: 2019-01-28 | End: 2019-01-28

## 2019-01-28 RX ORDER — POTASSIUM CHLORIDE 20 MEQ
10 PACKET (EA) ORAL ONCE
Qty: 0 | Refills: 0 | Status: COMPLETED | OUTPATIENT
Start: 2019-01-28 | End: 2019-01-28

## 2019-01-28 RX ORDER — SODIUM CHLORIDE 9 MG/ML
250 INJECTION INTRAMUSCULAR; INTRAVENOUS; SUBCUTANEOUS ONCE
Qty: 0 | Refills: 0 | Status: COMPLETED | OUTPATIENT
Start: 2019-01-28 | End: 2019-01-28

## 2019-01-28 RX ORDER — CHLORHEXIDINE GLUCONATE 213 G/1000ML
1 SOLUTION TOPICAL ONCE
Qty: 0 | Refills: 0 | Status: DISCONTINUED | OUTPATIENT
Start: 2019-01-28 | End: 2019-01-28

## 2019-01-28 RX ORDER — SODIUM CHLORIDE 9 MG/ML
500 INJECTION INTRAMUSCULAR; INTRAVENOUS; SUBCUTANEOUS
Qty: 0 | Refills: 0 | Status: DISCONTINUED | OUTPATIENT
Start: 2019-01-28 | End: 2019-01-28

## 2019-01-28 RX ORDER — CLOPIDOGREL BISULFATE 75 MG/1
75 TABLET, FILM COATED ORAL DAILY
Qty: 0 | Refills: 0 | Status: DISCONTINUED | OUTPATIENT
Start: 2019-01-29 | End: 2019-01-29

## 2019-01-28 RX ORDER — ATORVASTATIN CALCIUM 80 MG/1
1 TABLET, FILM COATED ORAL
Qty: 0 | Refills: 0 | COMMUNITY

## 2019-01-28 RX ORDER — SODIUM CHLORIDE 9 MG/ML
500 INJECTION, SOLUTION INTRAVENOUS
Qty: 0 | Refills: 0 | Status: DISCONTINUED | OUTPATIENT
Start: 2019-01-28 | End: 2019-01-28

## 2019-01-28 RX ORDER — ATORVASTATIN CALCIUM 80 MG/1
40 TABLET, FILM COATED ORAL AT BEDTIME
Qty: 0 | Refills: 0 | Status: DISCONTINUED | OUTPATIENT
Start: 2019-01-28 | End: 2019-01-29

## 2019-01-28 RX ORDER — PHENYLEPHRINE HYDROCHLORIDE 10 MG/ML
0.1 INJECTION INTRAVENOUS ONCE
Qty: 0 | Refills: 0 | Status: COMPLETED | OUTPATIENT
Start: 2019-01-28 | End: 2019-01-28

## 2019-01-28 RX ORDER — CILOSTAZOL 100 MG/1
1 TABLET ORAL
Qty: 0 | Refills: 0 | COMMUNITY

## 2019-01-28 RX ORDER — ASPIRIN/CALCIUM CARB/MAGNESIUM 324 MG
81 TABLET ORAL DAILY
Qty: 0 | Refills: 0 | Status: DISCONTINUED | OUTPATIENT
Start: 2019-01-29 | End: 2019-01-29

## 2019-01-28 RX ADMIN — SODIUM CHLORIDE 50 MILLILITER(S): 9 INJECTION, SOLUTION INTRAVENOUS at 09:19

## 2019-01-28 RX ADMIN — CLOPIDOGREL BISULFATE 600 MILLIGRAM(S): 75 TABLET, FILM COATED ORAL at 09:15

## 2019-01-28 RX ADMIN — PHENYLEPHRINE HYDROCHLORIDE 0.1 MILLIGRAM(S): 10 INJECTION INTRAVENOUS at 18:12

## 2019-01-28 RX ADMIN — ATORVASTATIN CALCIUM 40 MILLIGRAM(S): 80 TABLET, FILM COATED ORAL at 22:26

## 2019-01-28 RX ADMIN — SODIUM CHLORIDE 500 MILLILITER(S): 9 INJECTION INTRAMUSCULAR; INTRAVENOUS; SUBCUTANEOUS at 18:13

## 2019-01-28 RX ADMIN — Medication 10 MILLIEQUIVALENT(S): at 09:16

## 2019-01-28 RX ADMIN — SODIUM CHLORIDE 75 MILLILITER(S): 9 INJECTION INTRAMUSCULAR; INTRAVENOUS; SUBCUTANEOUS at 19:01

## 2019-01-28 RX ADMIN — PHENYLEPHRINE HYDROCHLORIDE 0.1 MILLIGRAM(S): 10 INJECTION INTRAVENOUS at 18:13

## 2019-01-28 RX ADMIN — Medication 81 MILLIGRAM(S): at 09:15

## 2019-01-28 RX ADMIN — Medication 0.5 MILLIGRAM(S): at 18:05

## 2019-01-28 NOTE — PROGRESS NOTE ADULT - SUBJECTIVE AND OBJECTIVE BOX
Interventional Cardiology PA Sheath Pull Note    Pt without complaints. VSS      Pre-Sheath Removal:    [X ] Right     [ ] Left          [X ] Groin    [ ] Brachial    [ ] 5Fr      [X ] 6Fr    [ ] 7Fr     [ ] 8Fr    [X] Arterial  [ ] Venous sheath in place    [- ] Hematoma        [- ] Bleed    Pulses:     [X ] Right     [ ] Left       DP:  [ ]  Doppler   [ ]  Faint    [X ]   1+    [ ] 2+       Hemostasis Achieved with:        23         minutes manual pressure    Vasovagal Reaction: No    Meds Given:  Atropine 0.5mg IVP x 1 prior to sheath pull for low BP      Post-Sheath Removal:    [X ] Right     [ ] Left          [X ] Groin    [ ] Brachial    [- ] Hematoma        [- ] Bleed       [- ] Bruit    Pulses:    [X ] Right     [ ] Left       DP:  [ ]  Doppler   [ ]  Faint    [X ]   1+    [ ] 2+     A/P:  s/p  [X ] PTA/STENT Rt ICA        -Continue bedrest (pt given instructions)  -Continue to monitor

## 2019-01-29 ENCOUNTER — TRANSCRIPTION ENCOUNTER (OUTPATIENT)
Age: 64
End: 2019-01-29

## 2019-01-29 VITALS
DIASTOLIC BLOOD PRESSURE: 71 MMHG | OXYGEN SATURATION: 97 % | SYSTOLIC BLOOD PRESSURE: 131 MMHG | RESPIRATION RATE: 16 BRPM | HEART RATE: 66 BPM

## 2019-01-29 LAB
ANION GAP SERPL CALC-SCNC: 10 MMOL/L — SIGNIFICANT CHANGE UP (ref 5–17)
ANION GAP SERPL CALC-SCNC: 9 MMOL/L — SIGNIFICANT CHANGE UP (ref 5–17)
BUN SERPL-MCNC: 32 MG/DL — HIGH (ref 7–23)
BUN SERPL-MCNC: 38 MG/DL — HIGH (ref 7–23)
CALCIUM SERPL-MCNC: 8.9 MG/DL — SIGNIFICANT CHANGE UP (ref 8.4–10.5)
CALCIUM SERPL-MCNC: 9 MG/DL — SIGNIFICANT CHANGE UP (ref 8.4–10.5)
CHLORIDE SERPL-SCNC: 104 MMOL/L — SIGNIFICANT CHANGE UP (ref 96–108)
CHLORIDE SERPL-SCNC: 106 MMOL/L — SIGNIFICANT CHANGE UP (ref 96–108)
CHLORIDE UR-SCNC: 20 MMOL/L — SIGNIFICANT CHANGE UP
CO2 SERPL-SCNC: 25 MMOL/L — SIGNIFICANT CHANGE UP (ref 22–31)
CO2 SERPL-SCNC: 26 MMOL/L — SIGNIFICANT CHANGE UP (ref 22–31)
CREAT ?TM UR-MCNC: 163 MG/DL — SIGNIFICANT CHANGE UP
CREAT SERPL-MCNC: 1.42 MG/DL — HIGH (ref 0.5–1.3)
CREAT SERPL-MCNC: 1.74 MG/DL — HIGH (ref 0.5–1.3)
GLUCOSE SERPL-MCNC: 100 MG/DL — HIGH (ref 70–99)
GLUCOSE SERPL-MCNC: 111 MG/DL — HIGH (ref 70–99)
HCT VFR BLD CALC: 41.4 % — SIGNIFICANT CHANGE UP (ref 39–50)
HGB BLD-MCNC: 13 G/DL — SIGNIFICANT CHANGE UP (ref 13–17)
MAGNESIUM SERPL-MCNC: 2.2 MG/DL — SIGNIFICANT CHANGE UP (ref 1.6–2.6)
MCHC RBC-ENTMCNC: 27 PG — SIGNIFICANT CHANGE UP (ref 27–34)
MCHC RBC-ENTMCNC: 31.4 G/DL — LOW (ref 32–36)
MCV RBC AUTO: 85.9 FL — SIGNIFICANT CHANGE UP (ref 80–100)
PHOSPHATE 24H UR-MCNC: 87 MG/DL — SIGNIFICANT CHANGE UP
PLATELET # BLD AUTO: 162 K/UL — SIGNIFICANT CHANGE UP (ref 150–400)
POTASSIUM SERPL-MCNC: 4 MMOL/L — SIGNIFICANT CHANGE UP (ref 3.5–5.3)
POTASSIUM SERPL-MCNC: 4.6 MMOL/L — SIGNIFICANT CHANGE UP (ref 3.5–5.3)
POTASSIUM SERPL-SCNC: 4 MMOL/L — SIGNIFICANT CHANGE UP (ref 3.5–5.3)
POTASSIUM SERPL-SCNC: 4.6 MMOL/L — SIGNIFICANT CHANGE UP (ref 3.5–5.3)
POTASSIUM UR-SCNC: 49 MMOL/L — SIGNIFICANT CHANGE UP
PROT ?TM UR-MCNC: 10 MG/DL — SIGNIFICANT CHANGE UP (ref 0–12)
PROT ?TM UR-MCNC: 10 MG/DL — SIGNIFICANT CHANGE UP (ref 0–12)
PROT/CREAT UR-RTO: 0.1 RATIO — SIGNIFICANT CHANGE UP (ref 0–0.2)
RBC # BLD: 4.82 M/UL — SIGNIFICANT CHANGE UP (ref 4.2–5.8)
RBC # FLD: 15.3 % — SIGNIFICANT CHANGE UP (ref 10.3–16.9)
SODIUM SERPL-SCNC: 140 MMOL/L — SIGNIFICANT CHANGE UP (ref 135–145)
SODIUM SERPL-SCNC: 140 MMOL/L — SIGNIFICANT CHANGE UP (ref 135–145)
SODIUM UR-SCNC: <20 MMOL/L — SIGNIFICANT CHANGE UP
UUN UR-MCNC: 1263 MG/DL — SIGNIFICANT CHANGE UP
WBC # BLD: 7.9 K/UL — SIGNIFICANT CHANGE UP (ref 3.8–10.5)
WBC # FLD AUTO: 7.9 K/UL — SIGNIFICANT CHANGE UP (ref 3.8–10.5)

## 2019-01-29 PROCEDURE — 93010 ELECTROCARDIOGRAM REPORT: CPT

## 2019-01-29 PROCEDURE — 99223 1ST HOSP IP/OBS HIGH 75: CPT

## 2019-01-29 PROCEDURE — 76770 US EXAM ABDO BACK WALL COMP: CPT | Mod: 26

## 2019-01-29 PROCEDURE — 99238 HOSP IP/OBS DSCHRG MGMT 30/<: CPT

## 2019-01-29 RX ORDER — ASPIRIN/CALCIUM CARB/MAGNESIUM 324 MG
1 TABLET ORAL
Qty: 30 | Refills: 11 | OUTPATIENT
Start: 2019-01-29 | End: 2020-01-23

## 2019-01-29 RX ORDER — ACETAMINOPHEN 500 MG
650 TABLET ORAL ONCE
Qty: 0 | Refills: 0 | Status: COMPLETED | OUTPATIENT
Start: 2019-01-29 | End: 2019-01-29

## 2019-01-29 RX ORDER — AMLODIPINE BESYLATE 2.5 MG/1
1 TABLET ORAL
Qty: 0 | Refills: 0 | COMMUNITY

## 2019-01-29 RX ORDER — CLOPIDOGREL BISULFATE 75 MG/1
1 TABLET, FILM COATED ORAL
Qty: 30 | Refills: 11 | OUTPATIENT
Start: 2019-01-29 | End: 2020-01-23

## 2019-01-29 RX ORDER — METOPROLOL TARTRATE 50 MG
1 TABLET ORAL
Qty: 0 | Refills: 0 | COMMUNITY

## 2019-01-29 RX ADMIN — Medication 650 MILLIGRAM(S): at 03:00

## 2019-01-29 RX ADMIN — Medication 81 MILLIGRAM(S): at 11:31

## 2019-01-29 RX ADMIN — Medication 650 MILLIGRAM(S): at 14:28

## 2019-01-29 RX ADMIN — CLOPIDOGREL BISULFATE 75 MILLIGRAM(S): 75 TABLET, FILM COATED ORAL at 11:31

## 2019-01-29 RX ADMIN — Medication 650 MILLIGRAM(S): at 02:30

## 2019-01-29 NOTE — DISCHARGE NOTE ADULT - ADDITIONAL INSTRUCTIONS
Please follow up with Dr. Agosto in 1 week. Please follow up with Dr. Agosto in 1 week.  Please follow up with Dr. Fregoso in 2-3 weeks.

## 2019-01-29 NOTE — DISCHARGE NOTE ADULT - CARE PROVIDER_API CALL
Raghav Agosto), Cardiology; Endovascular Medicine; Internal Medicine; Interventional Cardiology  [address]  [address], [place] [address]  Phone: [phone]  Fax: [phone]    Delvis Fregoso), Internal Medicine; Nephrology  [address] [address]  Phone: [phone]  Fax: [phone]

## 2019-01-29 NOTE — DISCHARGE NOTE ADULT - CARE PLAN
Principal Discharge DX:	Carotid stenosis  Goal:	Please follow up with Dr. Agosto in 1 week.  Secondary Diagnosis:	Essential hypertension  Goal:	Please take clonidine 0.1 mg two times daily ans stop taking amlodipine 5 mg daily and Toprol 25 mg daily.  Secondary Diagnosis:	Hyperlipidemia  Goal:	Please take lipitor 40 mg daily. Principal Discharge DX:	Carotid stenosis  Goal:	Please follow up with Dr. Agosto in 1 week.  Assessment and plan of treatment:	•	You underwent a carotid catheterization 1/28/19 and the blockage in your Right internal carotid artery was opened with stent placement.  NEVER MISS A DOSE OF ASPIRIN OR PLAVIX; IF YOU DO, YOU ARE AT RISK OF YOUR STENT CLOSING AND HAVING A HEART ATTACK. DO NOT STOP THESE TWO MEDICATIONS UNLESS INSTRUCTED TO DO SO BY YOUR CARDIOLOGIST  Secondary Diagnosis:	Essential hypertension  Goal:	Please take clonidine 0.1 mg two times daily ans stop taking amlodipine 5 mg daily and Toprol 25 mg daily.  Secondary Diagnosis:	Hyperlipidemia  Goal:	Please take lipitor 40 mg daily. Principal Discharge DX:	Carotid stenosis  Goal:	Please follow up with Dr. Agosto in 1 week.  Assessment and plan of treatment:	•	You underwent a carotid catheterization 1/28/19 and the blockage in your Right internal carotid artery was opened with stent placement.  NEVER MISS A DOSE OF ASPIRIN OR PLAVIX; IF YOU DO, YOU ARE AT RISK OF YOUR STENT CLOSING AND HAVING A HEART ATTACK. DO NOT STOP THESE TWO MEDICATIONS UNLESS INSTRUCTED TO DO SO BY YOUR CARDIOLOGIST  Secondary Diagnosis:	Essential hypertension  Goal:	Please take clonidine 0.1 mg two times daily ans stop taking amlodipine 5 mg daily and Toprol 25 mg daily. Please follow up with Dr. Agosto in 1 week.  Secondary Diagnosis:	Hyperlipidemia  Goal:	Please take lipitor 40 mg daily.  Secondary Diagnosis:	CKD (chronic kidney disease), stage III  Goal:	Please follow up with Dr. Fregoso in 2-3 weeks.

## 2019-01-29 NOTE — DISCHARGE NOTE ADULT - PLAN OF CARE
Please follow up with Dr. Agosto in 1 week. Please take clonidine 0.1 mg two times daily ans stop taking amlodipine 5 mg daily and Toprol 25 mg daily. Please take lipitor 40 mg daily. •	You underwent a carotid catheterization 1/28/19 and the blockage in your Right internal carotid artery was opened with stent placement.  NEVER MISS A DOSE OF ASPIRIN OR PLAVIX; IF YOU DO, YOU ARE AT RISK OF YOUR STENT CLOSING AND HAVING A HEART ATTACK. DO NOT STOP THESE TWO MEDICATIONS UNLESS INSTRUCTED TO DO SO BY YOUR CARDIOLOGIST Please take clonidine 0.1 mg two times daily ans stop taking amlodipine 5 mg daily and Toprol 25 mg daily. Please follow up with Dr. Agosto in 1 week. Please follow up with Dr. Fregoso in 2-3 weeks.

## 2019-01-29 NOTE — DISCHARGE NOTE ADULT - INSTRUCTIONS
•	Your procedure was done through your groin.   •	You do not need to keep this area covered and you may shower  •	Please avoid any heavy lifting  (no more than 3 to 5 lbs) or strenuous activity for five days  •	If you develop any swelling, bleeding, hardening of the skin (hematoma formation), acute pain, numbness/tingling  in your leg please contact your doctor immediately or call our 24/7 line: [phone]

## 2019-01-29 NOTE — PROGRESS NOTE ADULT - SUBJECTIVE AND OBJECTIVE BOX
INTERVENTIONAL CARDIOLOGY FOLLOW UP NOTE    -Patient seen and examined this am    -No events overnight    -No complaints this am, no neurologic complaints    -slight discomfort of neck    VASCULAR ACCESS EXAM:    FEMORAL:    2+ right/left femoral pulse    2+ DP/PT pulses.    -Access site clean, non-tender, without ecchymosis or hematoma.    A/P    S/p carotid intervention via R femoral approach with no evidence of vascular complications post procedure.    -continue with current medications

## 2019-01-29 NOTE — CONSULT NOTE ADULT - SUBJECTIVE AND OBJECTIVE BOX
Kindly asked by Dr. Agosto to see patient for ZARIA.     HPI:    64 y.o Male, former heavy EtoH user and recreational Marijuana user (last use >2 months ago), with FHx of CAD and PMHx of HTN, HLD, DM, Chronic Systolic CHF (EF 30% by cath 2018), CKD Stage 3, Known CAD s/p 4V CABG  (LIMA-LAD, free MIGUEL-Diag 1, SVG-OM1, SVG-RCA) @ [facility] with most recent Cardiac Cath @ [facility] 2018 revealing 3VCAD; mLAD , Diag 1 , 70-80% OM1, mRCa , patent grafts X 4), and known PVD s/p PTCA/BMS R external iliac artery @ [facility] on 18, who was recently found to have a R carotid bruit on routine exam.  Subsequently, pt underwent CTA of neck on 19 which revealed greater than 70% right carotid stenosis.  Mild left carotid artery stenosis.  He remains asymptomatic.  Deneis any hc of CVA, TIA, blurry vision, weaknes or numbness on one side of face, arm/leg, or body, slurred speech, unsteady gate, dizziness, confusion, or difficulty swallowing.        Given patient's risk factors, known Atherosclerosis, and R Carotid Stenosis, pt is now referred to [facility] for recommended Carotid Angiogram with possible intervention. (2019 17:29)    Creatinine increased from 1.3 at baseline to 1.7 today.    No recent NSAID use. Reportedly creatinine has remained approximately 1.3 for years.     PAST MEDICAL & SURGICAL HISTORY:  PVD (peripheral vascular disease)  Diabetes: recently diagnosed 1 month ago  Osteoarthritis: of b/l feet  CAD (coronary artery disease)  Hyperlipidemia  Essential hypertension  S/P CABG (coronary artery bypass graft): @[facility]  (4V per pt)      MEDICATIONS:  aspirin enteric coated 81 milliGRAM(s) Oral daily  atorvastatin 40 milliGRAM(s) Oral at bedtime  cloNIDine 0.1 milliGRAM(s) Oral two times a day  clopidogrel Tablet 75 milliGRAM(s) Oral daily  sodium chloride 0.9%. 500 milliLiter(s) IV Continuous <Continuous>      Family history of MI (myocardial infarction) (Mother)  Family history of early CAD (Uncle)  No pertinent family history in first degree relatives      SOCIAL HISTORY: social etoh use     REVIEW OF SYSTEMS:  Constitutional: No fevers.   Card: No chest pain.   Resp: No SOB.  GI: No nausea or vomiting. No abdominal pain.  : No dysuria. Neuro: No focal weakness or sensory loss.  Eyes: No visual symptoms. MS: No joint swelling.  Skin: No rashes. Psych: No psychosis.    PHYSICAL EXAM:    2019 07:  -  2019 07:00  --------------------------------------------------------  IN: 525 mL / OUT: 580 mL / NET: -55 mL    2019 07:  -  2019 20:35  --------------------------------------------------------  IN: 480 mL / OUT: 100 mL / NET: 380 mL      Constitutional: T(C): 36.9 (2019 14:37), Max: 37.3 (2019 06:01)  HR: 66 (2019 17:02) (50 - 66)  BP: 131/71 (2019 17:02) (100/50 - 131/71)  RR: 16 (2019 17:02) (16 - 19)  SpO2: 97% (2019 17:02) (93% - 98%)  Appearance: Alert, pleasant, INAD.  Eyes: Conjunctivae pink, moist. Pupils equal and reactive.  ENT: External ears/nose normal appearing. Lips normal, dentition good.  Lymphatic: No cervical lymphadenopathy. No supraclavicular lymphadenopathy.  Cardiac: No rubs. NO MURMURS. Extremities: NO LE EDEMA.  Respiratory: Effort no accessory muscle use. Lungs: CLEAR TO AUSCULTATION.  Abdomen: Soft. No masses. Nontender. Liver: Not palpable. Spleen: Not palpable.  Musculoskeltal digits: No clubbing or cyanosis. Tone normal. Moves all four extremities.  Skin inspection: No rashes. Palpation: No abnormalities. NO BLUE TOES OR LIVIDO RETICULARIS.   Neuro: Cranial nerves: no facial assymetry or deficits noted. Sensation: LE intact to light touch.  Psych: Oriented to person, place, situation. Mood/affect: Not depressed.     DATA:  140    |  106    |  32<H>  ----------------------------<  111<H>  Ca:8.9   (2019 15:19)  4.6     |  25     |  1.42<H>      eGFR if Non : 52 <L>  eGFR if : 60    TPro  7.4    /  Alb  4.3    /  TBili  0.6    /  DBili  x      /  AST  26     /  ALT  33     /  AlkPhos  68     2019 07:57    SCr 1.42 [2019 15:19]  SCr 1.74 [2019 06:54]  SCr 1.39 [2019 07:57]                          13.0   7.9   )-----------( 162      ( 2019 06:54 )             41.4     Phos:-- M.2 mg/dL PTH:-- Uric acid:-- Serum Osm:--  Ferritin:-- Iron:-- TIBC:-- Tsat:--  B12:-- TSH:-- (2019 06:54)      UProt:-- UCr:163 mg/dL P/C Ratio:0.1 Ratio 24 hour Prot:-- UVol:-- CrCl:--  Lesia:<20 mmol/L UOsm:-- UVol:-- UCl:20 mmol/L UK:49 mmol/L (2019 10:24)

## 2019-01-29 NOTE — DISCHARGE NOTE ADULT - MEDICATION SUMMARY - MEDICATIONS TO STOP TAKING
I will STOP taking the medications listed below when I get home from the hospital:    amLODIPine 10 mg oral tablet  -- 1 tab(s) by mouth once a day    Metoprolol Succinate  mg oral tablet, extended release  -- 1 tab(s) by mouth once a day

## 2019-01-29 NOTE — DISCHARGE NOTE ADULT - MEDICATION SUMMARY - MEDICATIONS TO TAKE
I will START or STAY ON the medications listed below when I get home from the hospital:    aspirin 81 mg oral delayed release tablet  -- 1 tab(s) by mouth once a day  -- Indication: For CAROTID STENOSIS I65.29    cloNIDine 0.1 mg oral tablet  -- 1 tab(s) by mouth 2 times a day  -- Indication: For blood pressure    atorvastatin 40 mg oral tablet  -- 1 tab(s) by mouth once a day  -- Indication: For Cholesterol    clopidogrel 75 mg oral tablet  -- 1 tab(s) by mouth once a day  -- Indication: For CAROTID STENOSIS I65.29

## 2019-01-29 NOTE — CONSULT NOTE ADULT - ASSESSMENT
ZARIA s/p cath. This is most consistent with contrast nephropathy. Atheroemboli is unlikely. HTN controlled. Stable CKD likely due to HTN.     Suggest:    1. Follow SCr.  2. Continue clonidine.  3. Follow up with me as outpatient.    Please call with any questions.    Delvis Fregoso MD, FACP, FASN | kidney.formerly [facility]  Nephrology, Hypertension, and Internal Medicine  Mobile: [phone] (Daytime Hours Only)  Office/Answering Service: [phone]  Asst. Prof. of Medicine, [employer] at [facility] - Nephrology at [address]  [address], [address], [place], [place]

## 2019-01-29 NOTE — DISCHARGE NOTE ADULT - PATIENT PORTAL LINK FT
You can access the [facility] Portal, offered by [facility], by registering with the following website: [email or web address]

## 2019-01-29 NOTE — DISCHARGE NOTE ADULT - HOSPITAL COURSE
.... 64 y.o Male, former heavy EtoH user and recreational Marijuana user (last use >2 months ago), with FHx of CAD and PMHx of HTN, HLD, DM, Chronic Systolic CHF (EF 30% by cath 11/2018), CKD Stage 3, Known CAD s/p 4V CABG 2011 (LIMA-LAD, free MIGUEL-Diag 1, SVG-OM1, SVG-RCA) @ [facility] with most recent Cardiac Cath @ [facility] 11/30/2018 revealing 3VCAD; mLAD , Diag 1 , 70-80% OM1, mRCa , patent grafts X 4), and known PVD s/p PTCA/BMS R external iliac artery @ [facility] on 12/12/18, who was recently found to have a R carotid bruit on routine exam.  Subsequently, pt underwent CTA of neck on 01/09/19 which revealed greater than 70% right carotid stenosis.  Mild left carotid artery stenosis.  He remains asymptomatic.  Deneis any hc of CVA, TIA, blurry vision, weaknes or numbness on one side of face, arm/leg, or body, slurred speech, unsteady gate, dizziness, confusion, or difficulty swallowing.    Given patient's risk factors, known Atherosclerosis, and R Carotid Stenosis, pt is now referred to [facility] for recommended Carotid Angiogram with possible intervention. Pt. s/p  carotid angiogram 1/28/19 : PTCA and self expanding stent to ALEX, ALEX proximal tubular 90% stenosis ; EF 35% by ECHO ;RFA 6 F short sheath.        Pt. seen and examined at bedside today am. Pt. comfortable, denies any CP, SOB, dizziness, palpitations, R groin access site stable, no bleeding and hematoma noted, R distal pulse intact.     VSS. Labs stable o/n except for Cr. 1.7 today am; Cr.1.34 1/28; pt. s/p gentle hydration 2/2 low EF today am; pt. received 250 cc IV bolus and 100 cc NS X 6 hrs post procedure 1/28; Cr. 2 pm                … Dr. Fregoso consulted; Urine electrolytes normal; Retroperotenal US 1/29 revealed Normal size kidneys without hydronephrosis.;Bilateral ureteral jets present. Mild prostatic enlargement. 55 cc postvoid residual.    Home meds reviewed with Dr. Wagner/Surendra and pt. to be d/c on ASA 81 mg daily, Plavix 75 mg daily; Lipitor 40 mg daily and clonidine 0.1 mg BID; pt. to stop taking home amlodipine 10 mg daily and Toprol 100 mg daily as per Dr. Agosto.     Pt. stable to be d/c as per Dr. Alex and to f/u with Dr. Agosto in 1 week and Dr. Fregoso in 1-2 week.   Patient has been given appropriate discharge instructions including medication regimen, access site management and follow up. Prescriptions have been e-prescribed to patient's preferred pharmacy. 64 y.o Male, former heavy EtoH user and recreational Marijuana user (last use >2 months ago), with FHx of CAD and PMHx of HTN, HLD, DM, Chronic Systolic CHF (EF 30% by cath 11/2018), CKD Stage 3, Known CAD s/p 4V CABG 2011 (LIMA-LAD, free MIGUEL-Diag 1, SVG-OM1, SVG-RCA) @ [facility] with most recent Cardiac Cath @ [facility] 11/30/2018 revealing 3VCAD; mLAD , Diag 1 , 70-80% OM1, mRCa , patent grafts X 4), and known PVD s/p PTCA/BMS R external iliac artery @ [facility] on 12/12/18, who was recently found to have a R carotid bruit on routine exam.  Subsequently, pt underwent CTA of neck on 01/09/19 which revealed greater than 70% right carotid stenosis.  Mild left carotid artery stenosis.  He remains asymptomatic.  Deneis any hc of CVA, TIA, blurry vision, weaknes or numbness on one side of face, arm/leg, or body, slurred speech, unsteady gate, dizziness, confusion, or difficulty swallowing.    Given patient's risk factors, known Atherosclerosis, and R Carotid Stenosis, pt is now referred to [facility] for recommended Carotid Angiogram with possible intervention. Pt. s/p  carotid angiogram 1/28/19 : PTCA and self expanding stent to ALEX, ALEX proximal tubular 90% stenosis ; EF 35% by ECHO ;RFA 6 F short sheath.        Pt. seen and examined at bedside today am. Pt. comfortable, denies any CP, SOB, dizziness, palpitations, R groin access site stable, no bleeding and hematoma noted, R distal pulse intact.     VSS. Labs stable o/n except for Cr. 1.7 today am; Cr.1.34 1/28; pt. s/p gentle hydration 2/2 low EF today am; pt. received 250 cc IV bolus and 100 cc NS X 6 hrs post procedure 1/28; ; Dr. Fregoso consulted; Urine electrolytes normal; Retroperotenal US 1/29 revealed Normal size kidneys without hydronephrosis.;Bilateral ureteral jets present. Mild prostatic enlargement. 55 cc postvoid residual. Repeat Cr. 2 pm  1.42.    Home meds reviewed with Dr. Wagner/Surendra and pt. to be d/c on ASA 81 mg daily, Plavix 75 mg daily; Lipitor 40 mg daily and clonidine 0.1 mg BID; pt. to stop taking home amlodipine 10 mg daily and Toprol 100 mg daily as per Dr. Agosto.     Pt. stable to be d/c as per Dr. Alex and to f/u with Dr. Agosto in 1 week and Dr. Fregoso in 2-3  week.   Patient has been given appropriate discharge instructions including medication regimen, access site management and follow up. Prescriptions have been e-prescribed to patient's preferred pharmacy.

## 2019-02-01 VITALS
OXYGEN SATURATION: 96 % | HEART RATE: 97 BPM | WEIGHT: 195.11 LBS | SYSTOLIC BLOOD PRESSURE: 205 MMHG | TEMPERATURE: 99 F | RESPIRATION RATE: 18 BRPM | DIASTOLIC BLOOD PRESSURE: 126 MMHG

## 2019-02-01 DIAGNOSIS — Z91.09 OTHER ALLERGY STATUS, OTHER THAN TO DRUGS AND BIOLOGICAL SUBSTANCES: ICD-10-CM

## 2019-02-01 DIAGNOSIS — N17.9 ACUTE KIDNEY FAILURE, UNSPECIFIED: ICD-10-CM

## 2019-02-01 DIAGNOSIS — E78.5 HYPERLIPIDEMIA, UNSPECIFIED: ICD-10-CM

## 2019-02-01 DIAGNOSIS — T50.8X5A ADVERSE EFFECT OF DIAGNOSTIC AGENTS, INITIAL ENCOUNTER: ICD-10-CM

## 2019-02-01 DIAGNOSIS — E11.22 TYPE 2 DIABETES MELLITUS WITH DIABETIC CHRONIC KIDNEY DISEASE: ICD-10-CM

## 2019-02-01 DIAGNOSIS — E11.51 TYPE 2 DIABETES MELLITUS WITH DIABETIC PERIPHERAL ANGIOPATHY WITHOUT GANGRENE: ICD-10-CM

## 2019-02-01 DIAGNOSIS — I25.10 ATHEROSCLEROTIC HEART DISEASE OF NATIVE CORONARY ARTERY WITHOUT ANGINA PECTORIS: ICD-10-CM

## 2019-02-01 DIAGNOSIS — I65.21 OCCLUSION AND STENOSIS OF RIGHT CAROTID ARTERY: ICD-10-CM

## 2019-02-01 DIAGNOSIS — Z95.1 PRESENCE OF AORTOCORONARY BYPASS GRAFT: ICD-10-CM

## 2019-02-01 DIAGNOSIS — I70.209 UNSPECIFIED ATHEROSCLEROSIS OF NATIVE ARTERIES OF EXTREMITIES, UNSPECIFIED EXTREMITY: ICD-10-CM

## 2019-02-01 DIAGNOSIS — M19.072 PRIMARY OSTEOARTHRITIS, LEFT ANKLE AND FOOT: ICD-10-CM

## 2019-02-01 DIAGNOSIS — N18.3 CHRONIC KIDNEY DISEASE, STAGE 3 (MODERATE): ICD-10-CM

## 2019-02-01 DIAGNOSIS — F12.90 CANNABIS USE, UNSPECIFIED, UNCOMPLICATED: ICD-10-CM

## 2019-02-01 DIAGNOSIS — I50.22 CHRONIC SYSTOLIC (CONGESTIVE) HEART FAILURE: ICD-10-CM

## 2019-02-01 DIAGNOSIS — Z79.84 LONG TERM (CURRENT) USE OF ORAL HYPOGLYCEMIC DRUGS: ICD-10-CM

## 2019-02-01 DIAGNOSIS — I13.0 HYPERTENSIVE HEART AND CHRONIC KIDNEY DISEASE WITH HEART FAILURE AND STAGE 1 THROUGH STAGE 4 CHRONIC KIDNEY DISEASE, OR UNSPECIFIED CHRONIC KIDNEY DISEASE: ICD-10-CM

## 2019-02-01 DIAGNOSIS — Z88.0 ALLERGY STATUS TO PENICILLIN: ICD-10-CM

## 2019-02-01 DIAGNOSIS — M19.071 PRIMARY OSTEOARTHRITIS, RIGHT ANKLE AND FOOT: ICD-10-CM

## 2019-02-01 NOTE — ED ADULT TRIAGE NOTE - CHIEF COMPLAINT QUOTE
pt s/p right carotid artery stent placement on Monday complaining of dizziness and slight blurry vision with difficulty speaking today at 10pm. No facial droop or slurred speech noted.

## 2019-02-02 ENCOUNTER — INPATIENT (INPATIENT)
Facility: HOSPITAL | Age: 64
LOS: 0 days | Discharge: HOME | DRG: 69 | End: 2019-02-02
Attending: PSYCHIATRY & NEUROLOGY | Admitting: PSYCHIATRY & NEUROLOGY
Payer: COMMERCIAL

## 2019-02-02 ENCOUNTER — TRANSCRIPTION ENCOUNTER (OUTPATIENT)
Age: 64
End: 2019-02-02

## 2019-02-02 VITALS
OXYGEN SATURATION: 98 % | RESPIRATION RATE: 16 BRPM | HEART RATE: 76 BPM | SYSTOLIC BLOOD PRESSURE: 130 MMHG | DIASTOLIC BLOOD PRESSURE: 80 MMHG

## 2019-02-02 DIAGNOSIS — I16.0 HYPERTENSIVE URGENCY: ICD-10-CM

## 2019-02-02 DIAGNOSIS — I65.21 OCCLUSION AND STENOSIS OF RIGHT CAROTID ARTERY: ICD-10-CM

## 2019-02-02 DIAGNOSIS — R63.8 OTHER SYMPTOMS AND SIGNS CONCERNING FOOD AND FLUID INTAKE: ICD-10-CM

## 2019-02-02 DIAGNOSIS — I10 ESSENTIAL (PRIMARY) HYPERTENSION: ICD-10-CM

## 2019-02-02 DIAGNOSIS — E11.9 TYPE 2 DIABETES MELLITUS WITHOUT COMPLICATIONS: ICD-10-CM

## 2019-02-02 DIAGNOSIS — G45.9 TRANSIENT CEREBRAL ISCHEMIC ATTACK, UNSPECIFIED: ICD-10-CM

## 2019-02-02 DIAGNOSIS — N17.9 ACUTE KIDNEY FAILURE, UNSPECIFIED: ICD-10-CM

## 2019-02-02 DIAGNOSIS — I25.10 ATHEROSCLEROTIC HEART DISEASE OF NATIVE CORONARY ARTERY WITHOUT ANGINA PECTORIS: ICD-10-CM

## 2019-02-02 DIAGNOSIS — E78.5 HYPERLIPIDEMIA, UNSPECIFIED: ICD-10-CM

## 2019-02-02 DIAGNOSIS — Z86.79 PERSONAL HISTORY OF OTHER DISEASES OF THE CIRCULATORY SYSTEM: Chronic | ICD-10-CM

## 2019-02-02 DIAGNOSIS — Z91.89 OTHER SPECIFIED PERSONAL RISK FACTORS, NOT ELSEWHERE CLASSIFIED: ICD-10-CM

## 2019-02-02 DIAGNOSIS — Z95.1 PRESENCE OF AORTOCORONARY BYPASS GRAFT: Chronic | ICD-10-CM

## 2019-02-02 LAB
ALBUMIN SERPL ELPH-MCNC: 4.6 G/DL — SIGNIFICANT CHANGE UP (ref 3.3–5)
ALP SERPL-CCNC: 85 U/L — SIGNIFICANT CHANGE UP (ref 40–120)
ALT FLD-CCNC: 33 U/L — SIGNIFICANT CHANGE UP (ref 10–45)
ANION GAP SERPL CALC-SCNC: 11 MMOL/L — SIGNIFICANT CHANGE UP (ref 5–17)
ANION GAP SERPL CALC-SCNC: 14 MMOL/L — SIGNIFICANT CHANGE UP (ref 5–17)
APTT BLD: 33 SEC — SIGNIFICANT CHANGE UP (ref 27.5–36.3)
AST SERPL-CCNC: 30 U/L — SIGNIFICANT CHANGE UP (ref 10–40)
BASOPHILS NFR BLD AUTO: 0.6 % — SIGNIFICANT CHANGE UP (ref 0–2)
BILIRUB SERPL-MCNC: 0.4 MG/DL — SIGNIFICANT CHANGE UP (ref 0.2–1.2)
BUN SERPL-MCNC: 23 MG/DL — SIGNIFICANT CHANGE UP (ref 7–23)
BUN SERPL-MCNC: 26 MG/DL — HIGH (ref 7–23)
CALCIUM SERPL-MCNC: 9.4 MG/DL — SIGNIFICANT CHANGE UP (ref 8.4–10.5)
CALCIUM SERPL-MCNC: 9.8 MG/DL — SIGNIFICANT CHANGE UP (ref 8.4–10.5)
CHLORIDE SERPL-SCNC: 102 MMOL/L — SIGNIFICANT CHANGE UP (ref 96–108)
CHLORIDE SERPL-SCNC: 103 MMOL/L — SIGNIFICANT CHANGE UP (ref 96–108)
CO2 SERPL-SCNC: 26 MMOL/L — SIGNIFICANT CHANGE UP (ref 22–31)
CO2 SERPL-SCNC: 28 MMOL/L — SIGNIFICANT CHANGE UP (ref 22–31)
CREAT SERPL-MCNC: 1.31 MG/DL — HIGH (ref 0.5–1.3)
CREAT SERPL-MCNC: 1.6 MG/DL — HIGH (ref 0.5–1.3)
EOSINOPHIL NFR BLD AUTO: 2 % — SIGNIFICANT CHANGE UP (ref 0–6)
GLUCOSE BLDC GLUCOMTR-MCNC: 105 MG/DL — HIGH (ref 70–99)
GLUCOSE BLDC GLUCOMTR-MCNC: 108 MG/DL — HIGH (ref 70–99)
GLUCOSE SERPL-MCNC: 110 MG/DL — HIGH (ref 70–99)
GLUCOSE SERPL-MCNC: 93 MG/DL — SIGNIFICANT CHANGE UP (ref 70–99)
HCT VFR BLD CALC: 44.9 % — SIGNIFICANT CHANGE UP (ref 39–50)
HCT VFR BLD CALC: 45.1 % — SIGNIFICANT CHANGE UP (ref 39–50)
HGB BLD-MCNC: 14.4 G/DL — SIGNIFICANT CHANGE UP (ref 13–17)
HGB BLD-MCNC: 14.6 G/DL — SIGNIFICANT CHANGE UP (ref 13–17)
INR BLD: 1.11 — SIGNIFICANT CHANGE UP (ref 0.88–1.16)
LYMPHOCYTES # BLD AUTO: 15.4 % — SIGNIFICANT CHANGE UP (ref 13–44)
MAGNESIUM SERPL-MCNC: 2 MG/DL — SIGNIFICANT CHANGE UP (ref 1.6–2.6)
MAGNESIUM SERPL-MCNC: 2.1 MG/DL — SIGNIFICANT CHANGE UP (ref 1.6–2.6)
MCHC RBC-ENTMCNC: 27.3 PG — SIGNIFICANT CHANGE UP (ref 27–34)
MCHC RBC-ENTMCNC: 27.7 PG — SIGNIFICANT CHANGE UP (ref 27–34)
MCHC RBC-ENTMCNC: 31.9 G/DL — LOW (ref 32–36)
MCHC RBC-ENTMCNC: 32.5 G/DL — SIGNIFICANT CHANGE UP (ref 32–36)
MCV RBC AUTO: 85.2 FL — SIGNIFICANT CHANGE UP (ref 80–100)
MCV RBC AUTO: 85.4 FL — SIGNIFICANT CHANGE UP (ref 80–100)
MONOCYTES NFR BLD AUTO: 11 % — SIGNIFICANT CHANGE UP (ref 2–14)
NEUTROPHILS NFR BLD AUTO: 71 % — SIGNIFICANT CHANGE UP (ref 43–77)
PA ADP PRP-ACNC: 146 PRU — LOW (ref 194–418)
PHOSPHATE SERPL-MCNC: 4.1 MG/DL — SIGNIFICANT CHANGE UP (ref 2.5–4.5)
PLATELET # BLD AUTO: 174 K/UL — SIGNIFICANT CHANGE UP (ref 150–400)
PLATELET # BLD AUTO: 174 K/UL — SIGNIFICANT CHANGE UP (ref 150–400)
PLATELET RESPONSE ASPIRIN RESULT: 456 ARU — SIGNIFICANT CHANGE UP (ref 350–700)
POTASSIUM SERPL-MCNC: 3.9 MMOL/L — SIGNIFICANT CHANGE UP (ref 3.5–5.3)
POTASSIUM SERPL-MCNC: 4.1 MMOL/L — SIGNIFICANT CHANGE UP (ref 3.5–5.3)
POTASSIUM SERPL-SCNC: 3.9 MMOL/L — SIGNIFICANT CHANGE UP (ref 3.5–5.3)
POTASSIUM SERPL-SCNC: 4.1 MMOL/L — SIGNIFICANT CHANGE UP (ref 3.5–5.3)
PROT SERPL-MCNC: 8.2 G/DL — SIGNIFICANT CHANGE UP (ref 6–8.3)
PROTHROM AB SERPL-ACNC: 12.6 SEC — SIGNIFICANT CHANGE UP (ref 10–12.9)
RBC # BLD: 5.27 M/UL — SIGNIFICANT CHANGE UP (ref 4.2–5.8)
RBC # BLD: 5.28 M/UL — SIGNIFICANT CHANGE UP (ref 4.2–5.8)
RBC # FLD: 14.9 % — SIGNIFICANT CHANGE UP (ref 10.3–16.9)
RBC # FLD: 14.9 % — SIGNIFICANT CHANGE UP (ref 10.3–16.9)
SODIUM SERPL-SCNC: 142 MMOL/L — SIGNIFICANT CHANGE UP (ref 135–145)
SODIUM SERPL-SCNC: 142 MMOL/L — SIGNIFICANT CHANGE UP (ref 135–145)
TROPONIN T SERPL-MCNC: <0.01 NG/ML — SIGNIFICANT CHANGE UP (ref 0–0.01)
TSH SERPL-MCNC: 3.04 UIU/ML — SIGNIFICANT CHANGE UP (ref 0.35–4.94)
WBC # BLD: 6.9 K/UL — SIGNIFICANT CHANGE UP (ref 3.8–10.5)
WBC # BLD: 8.6 K/UL — SIGNIFICANT CHANGE UP (ref 3.8–10.5)
WBC # FLD AUTO: 6.9 K/UL — SIGNIFICANT CHANGE UP (ref 3.8–10.5)
WBC # FLD AUTO: 8.6 K/UL — SIGNIFICANT CHANGE UP (ref 3.8–10.5)

## 2019-02-02 PROCEDURE — 83735 ASSAY OF MAGNESIUM: CPT

## 2019-02-02 PROCEDURE — 84100 ASSAY OF PHOSPHORUS: CPT

## 2019-02-02 PROCEDURE — 85027 COMPLETE CBC AUTOMATED: CPT

## 2019-02-02 PROCEDURE — 70496 CT ANGIOGRAPHY HEAD: CPT

## 2019-02-02 PROCEDURE — 70496 CT ANGIOGRAPHY HEAD: CPT | Mod: 26

## 2019-02-02 PROCEDURE — 80053 COMPREHEN METABOLIC PANEL: CPT

## 2019-02-02 PROCEDURE — 71045 X-RAY EXAM CHEST 1 VIEW: CPT | Mod: 26

## 2019-02-02 PROCEDURE — 82962 GLUCOSE BLOOD TEST: CPT

## 2019-02-02 PROCEDURE — 70498 CT ANGIOGRAPHY NECK: CPT

## 2019-02-02 PROCEDURE — 80048 BASIC METABOLIC PNL TOTAL CA: CPT

## 2019-02-02 PROCEDURE — 71045 X-RAY EXAM CHEST 1 VIEW: CPT

## 2019-02-02 PROCEDURE — 85025 COMPLETE CBC W/AUTO DIFF WBC: CPT

## 2019-02-02 PROCEDURE — 70498 CT ANGIOGRAPHY NECK: CPT | Mod: 26

## 2019-02-02 PROCEDURE — 99223 1ST HOSP IP/OBS HIGH 75: CPT

## 2019-02-02 PROCEDURE — 99291 CRITICAL CARE FIRST HOUR: CPT

## 2019-02-02 PROCEDURE — 0042T: CPT

## 2019-02-02 PROCEDURE — 85610 PROTHROMBIN TIME: CPT

## 2019-02-02 PROCEDURE — 36415 COLL VENOUS BLD VENIPUNCTURE: CPT

## 2019-02-02 PROCEDURE — 84443 ASSAY THYROID STIM HORMONE: CPT

## 2019-02-02 PROCEDURE — 85576 BLOOD PLATELET AGGREGATION: CPT

## 2019-02-02 PROCEDURE — 93005 ELECTROCARDIOGRAM TRACING: CPT

## 2019-02-02 PROCEDURE — 70450 CT HEAD/BRAIN W/O DYE: CPT

## 2019-02-02 PROCEDURE — 93010 ELECTROCARDIOGRAM REPORT: CPT

## 2019-02-02 PROCEDURE — 85730 THROMBOPLASTIN TIME PARTIAL: CPT

## 2019-02-02 PROCEDURE — 84484 ASSAY OF TROPONIN QUANT: CPT

## 2019-02-02 PROCEDURE — 70450 CT HEAD/BRAIN W/O DYE: CPT | Mod: 26,59

## 2019-02-02 RX ORDER — ASPIRIN/CALCIUM CARB/MAGNESIUM 324 MG
325 TABLET ORAL DAILY
Qty: 0 | Refills: 0 | Status: DISCONTINUED | OUTPATIENT
Start: 2019-02-02 | End: 2019-02-02

## 2019-02-02 RX ORDER — ASPIRIN/CALCIUM CARB/MAGNESIUM 324 MG
325 TABLET ORAL ONCE
Qty: 0 | Refills: 0 | Status: DISCONTINUED | OUTPATIENT
Start: 2019-02-02 | End: 2019-02-02

## 2019-02-02 RX ORDER — AMLODIPINE BESYLATE 2.5 MG/1
5 TABLET ORAL DAILY
Qty: 0 | Refills: 0 | Status: DISCONTINUED | OUTPATIENT
Start: 2019-02-02 | End: 2019-02-02

## 2019-02-02 RX ORDER — ATORVASTATIN CALCIUM 80 MG/1
1 TABLET, FILM COATED ORAL
Qty: 0 | Refills: 0 | COMMUNITY
Start: 2019-02-02

## 2019-02-02 RX ORDER — ASPIRIN/CALCIUM CARB/MAGNESIUM 324 MG
1 TABLET ORAL
Qty: 30 | Refills: 0
Start: 2019-02-02 | End: 2019-03-03

## 2019-02-02 RX ORDER — HEPARIN SODIUM 5000 [USP'U]/ML
5000 INJECTION INTRAVENOUS; SUBCUTANEOUS EVERY 8 HOURS
Qty: 0 | Refills: 0 | Status: DISCONTINUED | OUTPATIENT
Start: 2019-02-02 | End: 2019-02-02

## 2019-02-02 RX ORDER — ATORVASTATIN CALCIUM 80 MG/1
40 TABLET, FILM COATED ORAL AT BEDTIME
Qty: 0 | Refills: 0 | Status: DISCONTINUED | OUTPATIENT
Start: 2019-02-02 | End: 2019-02-02

## 2019-02-02 RX ORDER — INFLUENZA VIRUS VACCINE 15; 15; 15; 15 UG/.5ML; UG/.5ML; UG/.5ML; UG/.5ML
0.5 SUSPENSION INTRAMUSCULAR ONCE
Qty: 0 | Refills: 0 | Status: DISCONTINUED | OUTPATIENT
Start: 2019-02-02 | End: 2019-02-02

## 2019-02-02 RX ORDER — AMLODIPINE BESYLATE 2.5 MG/1
1 TABLET ORAL
Qty: 30 | Refills: 0
Start: 2019-02-02 | End: 2019-03-03

## 2019-02-02 RX ORDER — CLOPIDOGREL BISULFATE 75 MG/1
75 TABLET, FILM COATED ORAL DAILY
Qty: 0 | Refills: 0 | Status: DISCONTINUED | OUTPATIENT
Start: 2019-02-02 | End: 2019-02-02

## 2019-02-02 RX ORDER — CLOPIDOGREL BISULFATE 75 MG/1
1 TABLET, FILM COATED ORAL
Qty: 0 | Refills: 0 | COMMUNITY
Start: 2019-02-02

## 2019-02-02 RX ORDER — ACETAMINOPHEN 500 MG
650 TABLET ORAL EVERY 6 HOURS
Qty: 0 | Refills: 0 | Status: DISCONTINUED | OUTPATIENT
Start: 2019-02-02 | End: 2019-02-02

## 2019-02-02 RX ORDER — INSULIN LISPRO 100/ML
VIAL (ML) SUBCUTANEOUS
Qty: 0 | Refills: 0 | Status: DISCONTINUED | OUTPATIENT
Start: 2019-02-02 | End: 2019-02-02

## 2019-02-02 RX ORDER — ATORVASTATIN CALCIUM 80 MG/1
40 TABLET, FILM COATED ORAL DAILY
Qty: 0 | Refills: 0 | Status: DISCONTINUED | OUTPATIENT
Start: 2019-02-02 | End: 2019-02-02

## 2019-02-02 RX ORDER — ATORVASTATIN CALCIUM 80 MG/1
1 TABLET, FILM COATED ORAL
Qty: 30 | Refills: 0
Start: 2019-02-02 | End: 2019-03-03

## 2019-02-02 RX ORDER — CLOPIDOGREL BISULFATE 75 MG/1
1 TABLET, FILM COATED ORAL
Qty: 30 | Refills: 0
Start: 2019-02-02 | End: 2019-03-03

## 2019-02-02 RX ORDER — ASPIRIN/CALCIUM CARB/MAGNESIUM 324 MG
1 TABLET ORAL
Qty: 0 | Refills: 0 | COMMUNITY
Start: 2019-02-02

## 2019-02-02 RX ORDER — SODIUM CHLORIDE 9 MG/ML
1000 INJECTION INTRAMUSCULAR; INTRAVENOUS; SUBCUTANEOUS
Qty: 0 | Refills: 0 | Status: DISCONTINUED | OUTPATIENT
Start: 2019-02-02 | End: 2019-02-02

## 2019-02-02 RX ORDER — ASPIRIN/CALCIUM CARB/MAGNESIUM 324 MG
243 TABLET ORAL ONCE
Qty: 0 | Refills: 0 | Status: COMPLETED | OUTPATIENT
Start: 2019-02-02 | End: 2019-02-02

## 2019-02-02 RX ORDER — ATORVASTATIN CALCIUM 80 MG/1
1 TABLET, FILM COATED ORAL
Qty: 0 | Refills: 0 | COMMUNITY

## 2019-02-02 RX ADMIN — Medication 325 MILLIGRAM(S): at 11:50

## 2019-02-02 RX ADMIN — SODIUM CHLORIDE 100 MILLILITER(S): 9 INJECTION INTRAMUSCULAR; INTRAVENOUS; SUBCUTANEOUS at 05:44

## 2019-02-02 RX ADMIN — Medication 650 MILLIGRAM(S): at 08:45

## 2019-02-02 RX ADMIN — Medication 650 MILLIGRAM(S): at 07:55

## 2019-02-02 RX ADMIN — Medication 243 MILLIGRAM(S): at 00:57

## 2019-02-02 RX ADMIN — HEPARIN SODIUM 5000 UNIT(S): 5000 INJECTION INTRAVENOUS; SUBCUTANEOUS at 14:56

## 2019-02-02 RX ADMIN — HEPARIN SODIUM 5000 UNIT(S): 5000 INJECTION INTRAVENOUS; SUBCUTANEOUS at 05:44

## 2019-02-02 RX ADMIN — Medication 0.1 MILLIGRAM(S): at 02:21

## 2019-02-02 NOTE — CONSULT NOTE ADULT - ASSESSMENT
63 year old M with PMH HTN, HLD, DM, HF, CKD, CAD s/p 4V CABG (2011), PVD, s/p placement of R ICA stent last week    Recommend  - Increase ASA to 325mg total  - C/w plavix  - Bp control, sbp <200. Per Dr. Agosto, patient had discontinued Norvasc post hospitalization due to hypotension, will resume with hold parameter to avoid creating acute hypotension, will monitor BP as currently downtrending  - Admit to [facility] for close observation and telemetry monitoring of patient with TIA and recent carotid procedure 63 year old M with PMH HTN, HLD, DM, HF, CKD, CAD s/p 4V CABG (2011), PVD, s/p placement of R ICA stent last week. CT, CTA, CT perfusion with no acute abnormalities, ICA stent patent per read    Recommend  - Increase ASA to 325mg total  - C/w plavix  - Bp control, sbp <200. Per Dr. Agosto, patient had discontinued Norvasc post hospitalization due to hypotension, will resume with hold parameter to avoid creating acute hypotension, will monitor BP as currently downtrending  - Admit to [facility] for close observation and telemetry monitoring of patient with TIA and recent carotid procedure

## 2019-02-02 NOTE — H&P ADULT - PROBLEM SELECTOR PLAN 4
Pt previously with Cr 0.82, but s/p previous admission Cr ranging from 1.2-1.7 likely 2/2 contrast; Pt currently Cr 1.6, likely multifactorial, residual from previous contrast and current dehydration;   - Renal u/s normal no hydronephrosis 4 days ago  - Will start fluids once BP with goal in setting of ZARIA Pt previously with Cr 0.82, but s/p previous admission Cr ranging from 1.2-1.7 likely 2/2 contrast; Pt currently Cr 1.6, likely multifactorial, residual from previous contrast and current dehydration;   - Renal u/s no hydronephrosis 4 days ago  - Will start fluids once BP within goal in setting of ZARIA and contrast for scans Pt previously with Cr 0.82, but s/p previous admission Cr ranging from 1.2-1.7 likely 2/2 contrast; Pt currently Cr 1.6, likely multifactorial, residual from previous contrast and current dehydration;   - Renal u/s no hydronephrosis 4 days ago  - Will start fluids once BP within goal, in setting of ZARIA and contrast for scans

## 2019-02-02 NOTE — DISCHARGE NOTE ADULT - HOSPITAL COURSE
63YOM with PMH HTN, HLD, DM, HF( EF 35% with apex dyskinesis 12/2018), CKD, CAD s/p 4V CABG (2011), PVD, s/p placement of R ICA stent last week with Dr. Agosto, who presents 2/1 with approximately 2 hours of dizziness, blurry vision, and word finding difficulty, which has since resolved since onset (Last known normal 10pm 2/1). In ED vitals: T37.1, HR 97, HTN to 200s/120s, RR18 sat 96% on RA.  Labs s/f: BUN/Cr 26/1.6 (previous admission 1/2-1.6, but in Nov 2018 Cr 0.8s) ; . No meds given at ED. Stroke code was called, NIHSS 0; TPA not given 2/2 patient currently asymptomatic. CTH showed lacunar infarcts age indeterminate. CT perfusion and CTA head/neck were negative. Started on aspirin 325mg po qd. Continued on home plavix 75mg po qd, lipitor 40mg po qd. [employer] panel sent. Asymptomatic while on the unit. Hemodynamically stable for discharge. 63YOM with PMH HTN, HLD, DM, HF( EF 35% with apex dyskinesis 12/2018), CKD, CAD s/p 4V CABG (2011), PVD, s/p placement of R ICA stent last week with Dr. Agosto, who presents 2/1 with approximately 2 hours of dizziness, blurry vision, and word finding difficulty, which has since resolved since onset (Last known normal 10pm 2/1). In ED vitals: T37.1, HR 97, HTN to 200s/120s, RR18 sat 96% on RA.  Labs s/f: BUN/Cr 26/1.6 (previous admission 1/2-1.6, but in Nov 2018 Cr 0.8s) ; . No meds given at ED. Stroke code was called, NIHSS 0; TPA not given 2/2 patient currently asymptomatic. CTH showed lacunar infarcts age indeterminate. CT perfusion and CTA head/neck were negative. Started on aspirin 325mg po qd. Continued on home plavix 75mg po qd, lipitor 40mg po qd. [employer] panel sent. Asymptomatic while on the unit. Interventional cardiology cleared pt to go home. Hemodynamically stable for discharge.

## 2019-02-02 NOTE — DISCHARGE NOTE ADULT - CARE PROVIDER_API CALL
Hieu Duvall)  Neurology; Vascular Neurology  [address], [address] [place] [address]  Phone: [phone]  Fax: [phone]    Delvis Fregoso)  Internal Medicine; Nephrology  [address], [address]  [place] [address]  Phone: [phone]  Fax: [phone]    Raghav Agosto)  Cardiology; Endovascular Medicine; Internal Medicine; Interventional Cardiology  [address], [address] [place] [address]  Phone: [phone]  Fax: [phone]

## 2019-02-02 NOTE — ED PROVIDER NOTE - PROGRESS NOTE DETAILS
pt evaluated by stroke team - will admit pt to [facility].  recommend increasing aspirin to 325mg.  spoke with Dr. Agosto.

## 2019-02-02 NOTE — H&P ADULT - PMH
CAD (coronary artery disease)    Carotid occlusion, right  s/p ICA stent 1/2019  Diabetes  recently diagnosed 1 month ago  Essential hypertension    Hyperlipidemia    Osteoarthritis  of b/l feet  PVD (peripheral vascular disease) CAD (coronary artery disease)    Carotid stenosis, right  s/p stent 1/2019  Diabetes  recently diagnosed 1 month ago  Essential hypertension    Hyperlipidemia    Osteoarthritis  of b/l feet  PVD (peripheral vascular disease)

## 2019-02-02 NOTE — PROGRESS NOTE ADULT - SUBJECTIVE AND OBJECTIVE BOX
OVERNIGHT EVENTS: Admitted overnight    SUBJECTIVE / INTERVAL HPI: Patient seen and examined at bedside. Pt has a moderate R sided headache. Denies nausea/vomiting. No weakness, numbness, tingling    VITAL SIGNS:  Vital Signs Last 24 Hrs  T(C): 36.7 (02 Feb 2019 05:24), Max: 37.1 (01 Feb 2019 23:38)  T(F): 98.1 (02 Feb 2019 05:24), Max: 98.8 (01 Feb 2019 23:38)  HR: 86 (02 Feb 2019 08:30) (66 - 97)  BP: 159/91 (02 Feb 2019 08:30) (107/65 - 205/126)  BP(mean): 120 (02 Feb 2019 08:30) (81 - 130)  RR: 16 (02 Feb 2019 08:30) (16 - 20)  SpO2: 98% (02 Feb 2019 08:30) (96% - 100%)    PHYSICAL EXAM:    General: NAD, resting comfortably in bed  Cardiovascular: S1, S2 normal; RRR, no M/G/R  Respiratory: CTABL; no W/R/R  Gastrointestinal: soft, nontender, nondistended. bowel sounds present.  Skin: no ulcerations or visible rashes appreciated  Extremities: no EMILY  Neurological: AAOx3; normal eyebrow raising, symmetrical smile, normal facial sensation, tongue midline, shoulder shrug nL, motor 5/5 and sensation intact    MEDICATIONS:  MEDICATIONS  (STANDING):  aspirin 325 milliGRAM(s) Oral daily  atorvastatin 40 milliGRAM(s) Oral at bedtime  cloNIDine 0.1 milliGRAM(s) Oral two times a day  clopidogrel Tablet 75 milliGRAM(s) Oral daily  heparin  Injectable 5000 Unit(s) SubCutaneous every 8 hours  influenza   Vaccine 0.5 milliLiter(s) IntraMuscular once  insulin lispro (HumaLOG) corrective regimen sliding scale   SubCutaneous Before meals and at bedtime    MEDICATIONS  (PRN):  acetaminophen   Tablet .. 650 milliGRAM(s) Oral every 6 hours PRN Moderate Pain (4 - 6)      ALLERGIES:  Allergies    Mold (Other (Mod to Severe))  penicillins (Unknown)    Intolerances        LABS:                        14.4   8.6   )-----------( 174      ( 02 Feb 2019 00:04 )             45.1     02-02    142  |  102  |  26<H>  ----------------------------<  110<H>  3.9   |  26  |  1.60<H>    Ca    9.8      02 Feb 2019 00:04  Mg     2.0     02-02    TPro  8.2  /  Alb  4.6  /  TBili  0.4  /  DBili  x   /  AST  30  /  ALT  33  /  AlkPhos  85  02-02    PT/INR - ( 02 Feb 2019 00:04 )   PT: 12.6 sec;   INR: 1.11          PTT - ( 02 Feb 2019 00:04 )  PTT:33.0 sec    CAPILLARY BLOOD GLUCOSE  109 (02 Feb 2019 00:09)      POCT Blood Glucose.: 105 mg/dL (02 Feb 2019 06:47)      RADIOLOGY & ADDITIONAL TESTS: Reviewed.

## 2019-02-02 NOTE — H&P ADULT - HISTORY OF PRESENT ILLNESS
63YOM with PMH HTN, HLD, DM, HF, CKD, CAD s/p 4V CABG (2011), PVD, s/p placement of R ICA stent last week with Dr. Agosto, who presents 2/1 with approximately 2 hours of dizziness, blurry vision, and word finding difficulty, which has since resolved since onset (Last known normal 10pm 2/1). He states that he was told to seek ED care for the above symptoms given his recent carotid stent. On ROS patient with no current complaints now. Patient mentions compliance on medications, taking ASA/Plavix for stent, last took Plavix 2/1 5pm; Of note patient was previously noted to have carotid bruit on exam, with subsequent CTA showing greater than 70% stenosis of R ICA, s/p R ICA stent placed 1/28/19.      -At ED vitals: T37.1, HR 97, HTN to 200s/120s, RR18 sat 96% on RA.   - Labs s/f: FSG was 109.   -No meds given at ED.  Stroke code was called, NIHSS 0; TPA not given 2/2 patient currently asymptomatic; 63YOM with PMH HTN, HLD, DM, HF( EF 35% with apex dyskinesis 12/2018), CKD, CAD s/p 4V CABG (2011), PVD, s/p placement of R ICA stent last week with Dr. Agosto, who presents 2/1 with approximately 2 hours of dizziness, blurry vision, and word finding difficulty, which has since resolved since onset (Last known normal 10pm 2/1). He states that he was told to seek ED care for the above symptoms given his recent carotid stent. On ROS patient with no current complaints now. Patient mentions compliance on medications, taking ASA/Plavix for stent, last took Plavix 2/1 5pm; Of note patient was previously noted to have carotid bruit on exam, with subsequent CTA showing greater than 70% stenosis of R ICA, s/p R ICA stent placed 1/28/19.      -At ED vitals: T37.1, HR 97, HTN to 200s/120s, RR18 sat 96% on RA.   - Labs s/f: BUN/Cr 26/1.6 (previous admission 1/2-1.6, but in Nov 2018 Cr 0.8s) ; .   - No meds given at ED.  Stroke code was called, NIHSS 0; TPA not given 2/2 patient currently asymptomatic; 63YOM with PMH HTN, HLD, DM, HF( EF 35% with apex dyskinesis 12/2018), CKD, CAD s/p 4V CABG (2011), PVD, s/p placement of R ICA stent last week with Dr. Agosto, who presents 2/1 with approximately 2 hours of dizziness, blurry vision, and word finding difficulty, which has since resolved since onset (Last known normal 10pm 2/1). He states that he was told to seek ED care for the above symptoms given his recent carotid stent. Of note pt mentions he had missed his Clonidine AM dose today. On ROS patient with no current complaints now denying any motor weakness, sensation deficits, blurry vision, nausea, or word finding difficulties. Patient mentions compliance on medications otherwise, taking ASA/Plavix for stent, last took Plavix 2/1 5pm; Of note patient was previously noted to have carotid bruit on exam, with subsequent CTA showing greater than 70% stenosis of R ICA, s/p R ICA stent placed 1/28/19.     - At ED vitals: T37.1, HR 97, HTN to 200s/120s, RR18 sat 96% on RA.   - Labs s/f: BUN/Cr 26/1.6 (previous admission 1/2-1.6, but in Nov 2018 Cr 0.8s) ; .   - No meds given at ED.  Stroke code was called, NIHSS 0; TPA not given 2/2 patient currently asymptomatic;

## 2019-02-02 NOTE — ED ADULT NURSE NOTE - CHPI ED NUR SYMPTOMS NEG
no nausea/no numbness/no fever/no loss of consciousness/no change in level of consciousness/no dizziness/no confusion/no weakness/no vomiting

## 2019-02-02 NOTE — PROGRESS NOTE ADULT - PROBLEM SELECTOR PLAN 2
R ICA s/p stent placement by Dr. Agosto; Imaging showing patent stent; Dr. Agosto aware of admission; As above plan;

## 2019-02-02 NOTE — ED PROVIDER NOTE - OBJECTIVE STATEMENT
63M hx htn, dm, pvd, cad (CABG), s/p R carotid stent placement 1/28, c/o episode of lightheadedness, blurred vision and word finding difficulty. pt states occurred around 10P.  no neck pain. no slurred speech, no facial asymmetry. pt states earlier in the day he had a mild headache and took tyelnol with relief.  no n/v/d. no abd pain, no chest pain, no SOB.  pt states now feeling back to baseline.  takes aspirin and plavix daily.

## 2019-02-02 NOTE — H&P ADULT - PROBLEM SELECTOR PLAN 1
Pt with transient 2 hour duration of symptoms that have since resolved at presentation, therefore not given TPA; NIHSS 0; Pt mentions compliance with home medications, currently taking ASA/plavix s/p R ICA stent placement; Likely TIA in setting of known stenosis and resolved sx;   -CT head- Lacunar infarct/L caudate of unknown age  - CT perfusion nL  - CT angio head/neck- R ICA stent patent   - Continue with ASA/Plavix   - BP goals: Keep sBP <200s   - Echo 12/2018 EF35% with dyskinesis of apex   - A1C 5.9 1/2019  - f/u TSH   - Lipids normal 1/2019 Pt with transient 2 hour duration of symptoms that have since resolved at presentation, therefore not given TPA; NIHSS 0; Pt mentions compliance with home medications, currently taking ASA/plavix s/p R ICA stent placement; Likely TIA in setting of known stenosis and resolved sx;   -CT head- Lacunar infarct/L caudate of unknown age  - CT perfusion nL  - CT angio head/neck- R ICA stent patent   - Continue with ASA/Plavix   - BP goals: Keep sBP <200s   - Echo 12/2018 EF35% with dyskinesis of apex   - A1C 5.9 1/2019  - f/u TSH   - Lipids normal 1/2019  - Monitor neuro exam Pt with transient 2 hour duration of symptoms that have since resolved at presentation, therefore not given TPA; NIHSS 0; Pt mentions compliance with home medications, currently taking ASA/plavix s/p R ICA stent placement; Likely TIA in setting of known stenosis and resolved sx;   -CT head- Lacunar infarct/L caudate of unknown age  - CT perfusion nL  - CT angio head/neck- R ICA stent patent   - Continue with ASA/Plavix   - BP goals: Keep sBP <200s   - Echo 12/2018 EF35% with dyskinesis of apex   - A1C 5.9 1/2019  - f/u TSH   - Lipids normal 1/2019  - Monitor neuro exam q1h

## 2019-02-02 NOTE — ED PROVIDER NOTE - MEDICAL DECISION MAKING DETAILS
sudden onset lightheaded, blurry vision, aphasia, now resolved, stroke code called, recent carotid stenting  -check labs, ekg, CT/CTA

## 2019-02-02 NOTE — PROGRESS NOTE ADULT - PROBLEM SELECTOR PLAN 3
Pt with sBPs 200s with current renal injury and possible TIA; Will continue home medications as below; Likely 2/2 to missing medication dose today;   -c/w clonidine 0.1BID  -will consider restarting home amlodipine 5 qd

## 2019-02-02 NOTE — PROGRESS NOTE ADULT - PROBLEM SELECTOR PLAN 6
PMH of DM2 currently not taking any medications; Diet controlled; A1c 5.9 1/2019;  - C/w mISS and FSG monitoring

## 2019-02-02 NOTE — PROGRESS NOTE ADULT - PROBLEM SELECTOR PLAN 4
Pt previously with Cr 0.82, but s/p previous admission Cr ranging from 1.2-1.7 likely 2/2 contrast; Pt currently Cr 1.6, likely multifactorial, residual from previous contrast and current dehydration;   - Renal u/s no hydronephrosis 4 days ago  -trend BUN/CR  -avoid nephrotoxic agents

## 2019-02-02 NOTE — H&P ADULT - PROBLEM SELECTOR PLAN 10
1) PCP Contacted on Admission: (Y/N) --> Name & Phone #:  Dr. Agosto Interventional cardiologist (placed stent) spoke on night of admission 2/2    2) Date of Contact with PCP:  3) PCP Contacted at Discharge: (Y/N, N/A)  4) Summary of Handoff Given to PCP:   5) Post-Discharge Appointment Date and Location:

## 2019-02-02 NOTE — PROGRESS NOTE ADULT - PROBLEM SELECTOR PLAN 1
Pt with transient 2 hour duration of symptoms that have since resolved at presentation, therefore not given TPA; NIHSS 0; Pt mentions compliance with home medications, currently taking ASA/plavix s/p R ICA stent placement; Likely TIA in setting of known stenosis and resolved sx;   -CT head- Lacunar infarct/L caudate of unknown age  - CT perfusion nL  - CT angio head/neck- R ICA stent patent   - Continue with ASA/Plavix   - BP goals: Keep sBP <200s   - Echo 12/2018 EF35% with dyskinesis of apex   - A1C 5.9 1/2019  - f/u TSH   - Lipids normal 1/2019  - Monitor neuro exam q1h

## 2019-02-02 NOTE — H&P ADULT - NSHPLABSRESULTS_GEN_ALL_CORE
.  LABS:                         14.4   8.6   )-----------( 174      ( 02 Feb 2019 00:04 )             45.1     02-02    142  |  102  |  26<H>  ----------------------------<  110<H>  3.9   |  26  |  1.60<H>    Ca    9.8      02 Feb 2019 00:04  Mg     2.0     02-02    TPro  8.2  /  Alb  4.6  /  TBili  0.4  /  DBili  x   /  AST  30  /  ALT  33  /  AlkPhos  85  02-02    PT/INR - ( 02 Feb 2019 00:04 )   PT: 12.6 sec;   INR: 1.11          PTT - ( 02 Feb 2019 00:04 )  PTT:33.0 sec    CARDIAC MARKERS ( 02 Feb 2019 00:04 )  x     / <0.01 ng/mL / x     / x     / x                RADIOLOGY, EKG & ADDITIONAL TESTS: Reviewed.

## 2019-02-02 NOTE — CHART NOTE - NSCHARTNOTEFT_GEN_A_CORE
Spoke to Dr. Agosto (interventional cardiologist) over phone regarding R ICA stent. CTA head and neck showing patent R ICA stent. Dizziness, blurry vision, and word-finding difficulty very likely unrelated to stent as it is patent. Thus, states that primary team is free to discharge if neurologically cleared. Spoke to Dr. Agosto (interventional cardiologist) over phone regarding R ICA stent. CTA head and neck showing patent R ICA stent. Dizziness, blurry vision, and word-finding difficulty very likely unrelated to stent as it is patent. Thus, states that primary team is free to discharge if neurologically cleared.   Also recommended patient take clonidine 0.5mg BID regardless of BP, even if in SBP 100s. And to discontinue Norvasc. Communicated to patient over phone call at 6:10pm.

## 2019-02-02 NOTE — ED PROVIDER NOTE - PSH
History of carotid stenosis    S/P CABG (coronary artery bypass graft)  @[facility] 2011 (4V per pt)

## 2019-02-02 NOTE — DISCHARGE NOTE ADULT - PLAN OF CARE
Please call Dr. Duvall's office at [phone] to schedule an appointment for the week of February 11. You came to the hospital because you had two hours of dizziness, blurry vision, and word finding difficulty. In the emergency room you had imaging of your head done that showed that you did not have a stroke. You may have had a mini stroke called a TIA (transient ischemic attack). Please take aspirin 325mg one tablet once a day, plavix 75mg one tablet once a day, and lipitor 40mg one tablet at bedtime. You came to the hospital because you had two hours of dizziness, blurry vision, and word finding difficulty. In the emergency room you had imaging of your head done that showed that you did not have a stroke. You may have had a mini stroke called a TIA (transient ischemic attack). Please take aspirin 325mg one tablet once a day, plavix 75mg one tablet once a day, and lipitor 40mg one tablet at bedtime. Please call [phone] if Please go to your appointment with your interventional cardiologist Dr. Agosto on 2/4/2019 at 1030am You had a stent placed for your carotid stenosis. Please continue to take aspirin 325mg one tablet once a day, plavix 75mg one tablet once a day, and lipitor 40mg one tablet at bedtime Please continue to take aspirin 325mg one tablet once a day, plavix 75mg one tablet once a day, and lipitor 40mg one tablet at bedtime Please go to your appointment with your nephrologist Dr. Fregoso on 2/25/19 at 12:20pm. Please take amlodipine 5mg one tablet once a day and your home clonidine 0.1mg one tablet two times a day. Your diabetes is currently under control with your diet. Please continue to eat foods that are low sugar. You came to the hospital because you had two hours of dizziness, blurry vision, and word finding difficulty. In the emergency room you had imaging of your head done that showed that you did not have a stroke. You may have had a mini stroke called a TIA (transient ischemic attack). Please take aspirin 325mg one tablet once a day, plavix 75mg one tablet once a day, and lipitor 40mg one tablet at bedtime. Please call 911 if you have new stroke symptoms such as numbness or drooling on one side of your face, weakness in an arm or leg, confusion or difficulty speaking, or dizziness, severe headache, or vision loss

## 2019-02-02 NOTE — ED ADULT NURSE NOTE - OBJECTIVE STATEMENT
Received a 63 year old male with a chief complaint of blurry vision and "difficulty finding words" onset 2200 this evening. Patient reports that symptoms have subsided. Patient ambulatory. No slurring of speech noted. No facial droop. Extremity strength 5/5 on all four. Patient noted to be hypertensive upon initial triage. Patient reports that he was at [facility] last week for a right carotid ICA placement. Patient with a reported medical history of HTN, DM, CAD; Surgical history of Right ICA stent, CABG 2011.

## 2019-02-02 NOTE — H&P ADULT - PSH
History of carotid stenosis    S/P CABG (coronary artery bypass graft)  @[facility] 2011 (4V per pt)

## 2019-02-02 NOTE — DISCHARGE NOTE ADULT - CARE PROVIDERS DIRECT ADDRESSES
,[email or web address]

## 2019-02-02 NOTE — H&P ADULT - PROBLEM SELECTOR PLAN 5
PMH of HTN; continue home medications Clonidine 0.1 BID (Norvasc5 was recently increased to 10qd but having hypotensive episodes, therefore currently discontinued) PMH of HTN; continue home medications Clonidine 0.1 BID (Norvasc5 was recently increased to 10qd but having hypotensive episodes, therefore currently discontinued at home)  - Clonidine 0.1BID   - Norvasc will be started tomorrow 2/2 with holding parameters to avoid acute hypotension

## 2019-02-02 NOTE — DISCHARGE NOTE ADULT - MEDICATION SUMMARY - MEDICATIONS TO TAKE
I will START or STAY ON the medications listed below when I get home from the hospital:    aspirin 325 mg oral tablet  -- 1 tab(s) by mouth once a day  -- Indication: For stroke prevention    cloNIDine 0.1 mg oral tablet  -- 1 tab(s) by mouth 2 times a day  -- Indication: For Hypertension    atorvastatin 40 mg oral tablet  -- 1 tab(s) by mouth once a day (at bedtime)  -- Indication: For Hyperlipidemia    clopidogrel 75 mg oral tablet  -- 1 tab(s) by mouth once a day  -- Indication: For stroke prevention    amLODIPine 5 mg oral tablet  -- 1 tab(s) by mouth once a day  -- Indication: For Hypertension

## 2019-02-02 NOTE — H&P ADULT - ASSESSMENT
63YOM with PMH HTN, HLD, DM, HF, CKD, CAD s/p 4V CABG (2011), PVD, s/p placement of R ICA stent last week with Dr. Agosto, who presents 2/1 with approximately 2 hours of dizziness, blurry vision, and word finding difficulty, which has since resolved since onset (Last known normal 10pm 2/1), NIHSS 0, admitted for stroke workup, likely TIA.

## 2019-02-02 NOTE — H&P ADULT - NSHPPHYSICALEXAM_GEN_ALL_CORE
VITAL SIGNS:  T(F): 98.3 (02-02-19 @ 02:00), Max: 98.8 (02-01-19 @ 23:38)  HR: 84 (02-02-19 @ 01:28) (82 - 97)  BP: 162/83 (02-02-19 @ 01:28) (162/83 - 205/126)  BP(mean): --  RR: 20 (02-02-19 @ 01:28) (18 - 20)  SpO2: 100% (02-02-19 @ 01:28) (96% - 100%)    PHYSICAL EXAM:  Constitutional:  male in NAD  HEENT: NC/AT, PEERL Mucus membranes dry   Neck: supple;   Respiratory: CTA B/L; no W/R/R,   Cardiac: +S1/S2; RRR; no M/R/G;  Gastrointestinal: soft, NT/ND; +BSx4  Extremities: WWP, no peripheral edema  Vascular: 2+ radial, 1+ DP pulses B/L  Neurologic: awake alert and orientedx3, normal eyebrow raising, symmetrical smile, normal facial sensation, tongue midline, shoulder shrug nL, motor 5/5 and sensation intact

## 2019-02-02 NOTE — ED ADULT NURSE NOTE - NSIMPLEMENTINTERV_GEN_ALL_ED
Implemented All Universal Safety Interventions:  [place] to call system. Call bell, personal items and telephone within reach. Instruct patient to call for assistance. Room bathroom lighting operational. Non-slip footwear when patient is off stretcher. Physically safe environment: no spills, clutter or unnecessary equipment. Stretcher in lowest position, wheels locked, appropriate side rails in place.

## 2019-02-02 NOTE — CONSULT NOTE ADULT - SUBJECTIVE AND OBJECTIVE BOX
**STROKE CODE CONSULT NOTE**    Last known well time/Time of onset of symptoms: 10pm 2/1/2019    HPI: Patient is a 63 year old M with PMH HTN, HLD, DM, HF, CKD, CAD s/p 4V CABG (2011), PVD, s/p placement of R ICA stent last week with Dr. Agosto, who presents with approximately 2 hours of dizziness, blurry vision, and word finding difficulty, resolved since onset. He states that he was told to seek ED care for the above symptoms given his recent carotid stent. He presented to the emergency department and was found to have T37.1, HR 97, HTN to 200s/120s, RR18 sat 96% on RA. FSG was 109. Stroke code was called. Of note, patient was taking ASA 81 and plavix following his carotid stent placement, and last took his plavix at 5pm 2/1.      PAST MEDICAL & SURGICAL HISTORY:  PVD (peripheral vascular disease)  Diabetes: recently diagnosed 1 month ago  Osteoarthritis: of b/l feet  CAD (coronary artery disease)  Hyperlipidemia  Essential hypertension  S/P CABG (coronary artery bypass graft): @[facility] 2011 (4V per pt)      FAMILY HISTORY:  Family history of MI (myocardial infarction) (Mother)  Family history of early CAD (Uncle)      SOCIAL HISTORY:  former EtOH abuse    ROS:  Constitutional: No fever, weight loss or fatigue  Eyes: No eye pain, visual disturbances  ENMT:  No difficulty hearing, tinnitus, vertigo; No sinus or throat pain  Neck: No pain or stiffness  Respiratory: No cough, wheezing  Cardiovascular: No chest pain, palpitations, shortness of breath  Gastrointestinal: No abdominal pain. No nausea, vomiting. No diarrhea/constipation  Genitourinary: No dysuria, frequency, hematuria or incontinence  Neurological: As per HPI  Skin: No itching, burning  Musculoskeletal: No joint pain or swelling  Psychiatric: No depression, anxiety    MEDICATIONS  (STANDING):    MEDICATIONS  (PRN):      Allergies    Mold (Other (Mod to Severe))  penicillins (Unknown)    Intolerances        Vital Signs Last 24 Hrs  T(C): 37.1 (01 Feb 2019 23:38), Max: 37.1 (01 Feb 2019 23:38)  T(F): 98.8 (01 Feb 2019 23:38), Max: 98.8 (01 Feb 2019 23:38)  HR: 97 (01 Feb 2019 23:38) (97 - 97)  BP: 205/126 (01 Feb 2019 23:38) (205/126 - 205/126)  BP(mean): --  RR: 18 (01 Feb 2019 23:38) (18 - 18)  SpO2: 96% (01 Feb 2019 23:38) (96% - 96%)    PHYSICAL EXAM:  Constitutional: WDWN M, in NAD  Cardiovascular: regular rate  Pulmonary: no increased work of breathing, no respiratory distress  Neurologic:  Mental status: Awake, alert and oriented x3.  Recent and remote memory intact.  Naming, repetition and comprehension intact.  Attention/concentration intact.  No dysarthria, no aphasia.  Fund of knowledge appropriate.    Cranial nerves: Pupils equally round and reactive to light, visual fields full, no nystagmus, extraocular muscles intact, face symmetric, palate elevation symmetric, tongue was midline,   Motor:  Normal bulk and tone, no motor drift in all 4 extremities  Sensation: Intact to light touch  Coordination: No dysmetria on finger-to-nose and heel-to-shin  Gait: No ataxia    NIHSS: 0    Fingerstick Blood Glucose: CAPILLARY BLOOD GLUCOSE      POCT Blood Glucose.: 109 mg/dL (01 Feb 2019 23:48)       LABS:                        14.4   8.6   )-----------( 174      ( 02 Feb 2019 00:04 )             45.1                     RADIOLOGY & ADDITIONAL STUDIES:    IV-tPA (Y/N):   N                          Reason IV-tPA not given: Plavix taken **STROKE CODE CONSULT NOTE**    Last known well time/Time of onset of symptoms: 10pm 2/1/2019    HPI: Patient is a 63 year old M with PMH HTN, HLD, DM, HF, CKD, CAD s/p 4V CABG (2011), PVD, s/p placement of R ICA stent last week with Dr. Agosto, who presents with approximately 2 hours of dizziness, blurry vision, and word finding difficulty, resolved since onset. He states that he was told to seek ED care for the above symptoms given his recent carotid stent. He presented to the emergency department and was found to have T37.1, HR 97, HTN to 200s/120s, RR18 sat 96% on RA. FSG was 109. Stroke code was called. Of note, patient was taking ASA 81 and plavix following his carotid stent placement, and last took his plavix at 5pm 2/1.    PAST MEDICAL & SURGICAL HISTORY:  PVD (peripheral vascular disease)  Diabetes: recently diagnosed 1 month ago  Osteoarthritis: of b/l feet  CAD (coronary artery disease)  Hyperlipidemia  Essential hypertension  S/P CABG (coronary artery bypass graft): @[facility] 2011 (4V per pt)      FAMILY HISTORY:  Family history of MI (myocardial infarction) (Mother)  Family history of early CAD (Uncle)      SOCIAL HISTORY:  former EtOH abuse    ROS:  ROS as per HPI, 14-system review negative for pertinent positives except those mentioned above    MEDICATIONS  (STANDING):  Plavix  ASA81  Clonidine  Lipitor    MEDICATIONS  (PRN):      Allergies    Mold (Other (Mod to Severe))  penicillins (Unknown)    Intolerances        Vital Signs Last 24 Hrs  T(C): 37.1 (01 Feb 2019 23:38), Max: 37.1 (01 Feb 2019 23:38)  T(F): 98.8 (01 Feb 2019 23:38), Max: 98.8 (01 Feb 2019 23:38)  HR: 97 (01 Feb 2019 23:38) (97 - 97)  BP: 205/126 (01 Feb 2019 23:38) (205/126 - 205/126)  BP(mean): --  RR: 18 (01 Feb 2019 23:38) (18 - 18)  SpO2: 96% (01 Feb 2019 23:38) (96% - 96%)    PHYSICAL EXAM:  Constitutional: WDWN M, in NAD  Cardiovascular: regular rate  Pulmonary: no increased work of breathing, no respiratory distress  Neurologic:  Mental status: Awake, alert and oriented x3.  Recent and remote memory intact.  Naming, repetition and comprehension intact.  Attention/concentration intact.  No dysarthria, no aphasia.  Fund of knowledge appropriate.    Cranial nerves: Pupils equally round and reactive to light, visual fields full, no nystagmus, extraocular muscles intact, face symmetric, palate elevation symmetric, tongue was midline,   Motor:  Normal bulk and tone, no motor drift in all 4 extremities  Sensation: Intact to light touch  Coordination: No dysmetria on finger-to-nose and heel-to-shin  Gait: No ataxia    NIHSS: 0    Fingerstick Blood Glucose: CAPILLARY BLOOD GLUCOSE      POCT Blood Glucose.: 109 mg/dL (01 Feb 2019 23:48)       LABS:                        14.4   8.6   )-----------( 174      ( 02 Feb 2019 00:04 )             45.1                     RADIOLOGY & ADDITIONAL STUDIES:    IV-tPA (Y/N):   N                          Reason IV-tPA not given: rapidly improved neurologic deficits, no deficits on exam at time of stroke code.

## 2019-02-02 NOTE — H&P ADULT - PROBLEM SELECTOR PLAN 9
F: None - will start fluids once BP with goal in setting of ZARIA   E: Replete for K<4 Mag<2  N: DASH/TLC Diet  A: HSQ

## 2019-02-02 NOTE — DISCHARGE NOTE ADULT - PATIENT PORTAL LINK FT
You can access the [facility] Patient Portal, offered by [facility], by registering with the following website: [email or web address]

## 2019-02-02 NOTE — DISCHARGE NOTE ADULT - CARE PLAN
Principal Discharge DX:	TIA (transient ischemic attack)  Goal:	Please call Dr. Duvall's office at [phone] to schedule an appointment for the week of February 11.  Assessment and plan of treatment:	You came to the hospital because you had two hours of dizziness, blurry vision, and word finding difficulty. In the emergency room you had imaging of your head done that showed that you did not have a stroke. You may have had a mini stroke called a TIA (transient ischemic attack). Please take aspirin 325mg one tablet once a day, plavix 75mg one tablet once a day, and lipitor 40mg one tablet at bedtime.  Secondary Diagnosis:	Carotid stenosis, right  Secondary Diagnosis:	CAD (coronary artery disease)  Secondary Diagnosis:	Essential hypertension  Secondary Diagnosis:	Diabetes Principal Discharge DX:	TIA (transient ischemic attack)  Goal:	Please call Dr. Duvall's office at [phone] to schedule an appointment for the week of February 11.  Assessment and plan of treatment:	You came to the hospital because you had two hours of dizziness, blurry vision, and word finding difficulty. In the emergency room you had imaging of your head done that showed that you did not have a stroke. You may have had a mini stroke called a TIA (transient ischemic attack). Please take aspirin 325mg one tablet once a day, plavix 75mg one tablet once a day, and lipitor 40mg one tablet at bedtime. Please call [phone] if  Secondary Diagnosis:	Carotid stenosis, right  Goal:	Please go to your appointment with your interventional cardiologist Dr. Agosto on 2/4/2019 at 1030am  Assessment and plan of treatment:	You had a stent placed for your carotid stenosis. Please continue to take aspirin 325mg one tablet once a day, plavix 75mg one tablet once a day, and lipitor 40mg one tablet at bedtime  Secondary Diagnosis:	CAD (coronary artery disease)  Assessment and plan of treatment:	Please continue to take aspirin 325mg one tablet once a day, plavix 75mg one tablet once a day, and lipitor 40mg one tablet at bedtime  Secondary Diagnosis:	Essential hypertension  Goal:	Please go to your appointment with your nephrologist Dr. Fregoso on 2/25/19 at 12:20pm.  Assessment and plan of treatment:	Please take amlodipine 5mg one tablet once a day and your home clonidine 0.1mg one tablet two times a day.  Secondary Diagnosis:	Diabetes  Assessment and plan of treatment:	Your diabetes is currently under control with your diet. Please continue to eat foods that are low sugar. Principal Discharge DX:	TIA (transient ischemic attack)  Goal:	Please call Dr. Duvall's office at [phone] to schedule an appointment for the week of February 11.  Assessment and plan of treatment:	You came to the hospital because you had two hours of dizziness, blurry vision, and word finding difficulty. In the emergency room you had imaging of your head done that showed that you did not have a stroke. You may have had a mini stroke called a TIA (transient ischemic attack). Please take aspirin 325mg one tablet once a day, plavix 75mg one tablet once a day, and lipitor 40mg one tablet at bedtime. Please call 911 if you have new stroke symptoms such as numbness or drooling on one side of your face, weakness in an arm or leg, confusion or difficulty speaking, or dizziness, severe headache, or vision loss  Secondary Diagnosis:	Carotid stenosis, right  Goal:	Please go to your appointment with your interventional cardiologist Dr. Agosto on 2/4/2019 at 1030am  Assessment and plan of treatment:	You had a stent placed for your carotid stenosis. Please continue to take aspirin 325mg one tablet once a day, plavix 75mg one tablet once a day, and lipitor 40mg one tablet at bedtime  Secondary Diagnosis:	CAD (coronary artery disease)  Assessment and plan of treatment:	Please continue to take aspirin 325mg one tablet once a day, plavix 75mg one tablet once a day, and lipitor 40mg one tablet at bedtime  Secondary Diagnosis:	Essential hypertension  Goal:	Please go to your appointment with your nephrologist Dr. Fregoso on 2/25/19 at 12:20pm.  Assessment and plan of treatment:	Please take amlodipine 5mg one tablet once a day and your home clonidine 0.1mg one tablet two times a day.  Secondary Diagnosis:	Diabetes  Assessment and plan of treatment:	Your diabetes is currently under control with your diet. Please continue to eat foods that are low sugar.

## 2019-02-02 NOTE — H&P ADULT - PROBLEM SELECTOR PLAN 3
Pt with sBPs 200s with current renal injury and possible TIA; Will continue home medications as below;  - Clonidine 0.1BID   - Norvasc will be started tomorrow 2/2 with holding parameters [id number] or less Pt with sBPs 200s with current renal injury and possible TIA; Will continue home medications as below;  - Clonidine 0.1BID   - Norvasc will be started tomorrow 2/2 with holding parameters to avoid acute hypotension Pt with sBPs 200s with current renal injury and possible TIA; Will continue home medications as below; Likely 2/2 to missing medication dose today;   - Clonidine 0.1BID   - Norvasc will be started tomorrow 2/2 with holding parameters to avoid acute hypotension

## 2019-02-02 NOTE — H&P ADULT - PROBLEM SELECTOR PLAN 8
1) PCP Contacted on Admission: (Y/N) --> Name & Phone #:  Dr. Agosto Interventional cardiologist (placed stent) spoke on night of admission 2/2    2) Date of Contact with PCP:  3) PCP Contacted at Discharge: (Y/N, N/A)  4) Summary of Handoff Given to PCP:   5) Post-Discharge Appointment Date and Location: PMH of CAD on ASA/plavix   - C/w ASA, Plavix

## 2019-02-02 NOTE — PROGRESS NOTE ADULT - PROBLEM SELECTOR PLAN 5
PMH of HTN; continue home medications Clonidine 0.1 BID (Norvasc5 was recently increased to 10qd but having hypotensive episodes, therefore currently discontinued at home)  - Clonidine 0.1BID   - will consider restarting home amlodipine 5mg po qd today

## 2019-02-02 NOTE — DISCHARGE NOTE ADULT - ADDITIONAL INSTRUCTIONS
Please go to your appointment with your interventional cardiologist Dr. Agosto on 2/4/2019 at 1030am  Please go to your appointment with your nephrologist Dr. Fregoso on 2/25/19 at 12:20pm. Please call Dr. Duvall's office at [phone] to schedule an appointment for the week of February 11.   Please go to your appointment with your interventional cardiologist Dr. Agosto on 2/4/2019 at 1030am  Please go to your appointment with your nephrologist Dr. Fregoso on 2/25/19 at 12:20pm.

## 2019-02-02 NOTE — H&P ADULT - PROBLEM SELECTOR PLAN 2
R ICA s/p stent placement by Dr. Agosto; Imaging showing patent stent; Dr. Agosto aware of admission; R ICA s/p stent placement by Dr. Agosto; Imaging showing patent stent; Dr. Agosto aware of admission; As above plan;

## 2019-02-02 NOTE — H&P ADULT - PROBLEM SELECTOR PLAN 6
PMH of DM2 currently not taking any medications; Diet controlled; A1c 5.9 1/2019; PMH of DM2 currently not taking any medications; Diet controlled; A1c 5.9 1/2019;  - C/w mISS and FSG monitoring

## 2019-02-04 ENCOUNTER — APPOINTMENT (OUTPATIENT)
Dept: HEART AND VASCULAR | Facility: CLINIC | Age: 64
End: 2019-02-04
Payer: MEDICARE

## 2019-02-04 VITALS
SYSTOLIC BLOOD PRESSURE: 122 MMHG | OXYGEN SATURATION: 98 % | WEIGHT: 195 LBS | BODY MASS INDEX: 29.55 KG/M2 | HEART RATE: 75 BPM | TEMPERATURE: 99.2 F | DIASTOLIC BLOOD PRESSURE: 80 MMHG | HEIGHT: 68 IN

## 2019-02-04 VITALS — DIASTOLIC BLOOD PRESSURE: 70 MMHG | SYSTOLIC BLOOD PRESSURE: 110 MMHG

## 2019-02-04 PROCEDURE — 99213 OFFICE O/P EST LOW 20 MIN: CPT

## 2019-02-04 NOTE — REASON FOR VISIT
[Follow-Up - Clinic] : a clinic follow-up of [FreeTextEntry2] : carotid stenosis [FreeTextEntry1] : Had carotid

## 2019-02-04 NOTE — REVIEW OF SYSTEMS
[Fever] : no fever [Chills] : no chills [Blurry Vision] : no blurred vision [Seeing Double (Diplopia)] : no diplopia [Earache] : no earache [Shortness Of Breath] : no shortness of breath [Chest Pain] : no chest pain [Cough] : no cough [Wheezing] : no wheezing [Abdominal Pain] : no abdominal pain [Nausea] : no nausea [Vomiting] : no vomiting [Dizziness] : dizziness [Numbness (Hypesthesia)] : no numbness [Convulsions] : no convulsions [Tingling (Paresthesia)] : no tingling [Confusion] : no confusion was observed [Anxiety] : no anxiety [Easy Bleeding] : no tendency for easy bleeding [Easy Bruising] : no tendency for easy bruising

## 2019-02-04 NOTE — ASSESSMENT
[FreeTextEntry1] : Transient headache, dizziness and ?loss of words: unclear if this was a TIA episode or simply symptomatic HTN, possibly due to rebound HTN in setting of clonidine noncompliance.  \par \par Plan:\par 1. Stress importance of compliance with clonidine therapy.\par 2. Given relatively high HR now, will restart Metoprolol ER 50 mg po qd and continue to hold Lisinopril and Norvasc for now until he sees Dr. Melendez in the office.\par 3. F/u with Dr. Melendez in 2 weeks.\par 4. F/u with me in 4 weeks for post-stent surveillance Duplex USG and visit.

## 2019-02-04 NOTE — HISTORY OF PRESENT ILLNESS
[FreeTextEntry1] : Had carotid angiogram which showed 95% ALEX stenosis treated with Roadsaver stent with Nanoparasol distal protection device.  Pt had mild elevation in Cr post-procedure and resolved with IV hydration.  Re-admitted over the weekend to [facility] with head pressure, blurry vision and ?difficulty finding words.  SBP over 200.  BP reduced after po clonidine and symptom resolved within a few hours.  Head CT showed left thalamic and claudate lacunar of indetermined age.  CTA showed patent ALEX stent.  Pt reports that he may have forgotten to take clonidine at home and all of his other anti-HTN meds were held.  No further symptoms after discharge on Sunday.  Feeling well with headache, dizziness, visual or speech disturbances.  No focal weakness or numbness.

## 2019-02-04 NOTE — PHYSICAL EXAM
[General Appearance - Well Developed] : well developed [General Appearance - Well Nourished] : well nourished [Normal Conjunctiva] : the conjunctiva exhibited no abnormalities [Normal Oral Mucosa] : normal oral mucosa [JVD Elevated _____cm] : JVD elevated [unfilled] ~U cm above clavicle [FreeTextEntry1] : mild right carotid bruit [] : no respiratory distress [Auscultation Breath Sounds / Voice Sounds] : lungs were clear to auscultation bilaterally [Heart Sounds] : normal S1 and S2 [Murmurs] : no murmurs present [Bowel Sounds] : normal bowel sounds [Abdomen Tenderness] : non-tender [Abdomen Mass (___ Cm)] : no abdominal mass palpated [Abnormal Walk] : normal gait [Nail Clubbing] : no clubbing of the fingernails [Cyanosis, Localized] : no localized cyanosis [Skin Color & Pigmentation] : normal skin color and pigmentation [Normal] : the deep tendon reflexes were normal

## 2019-02-05 ENCOUNTER — INBOUND DOCUMENT (OUTPATIENT)
Age: 64
End: 2019-02-05

## 2019-02-06 DIAGNOSIS — I65.21 OCCLUSION AND STENOSIS OF RIGHT CAROTID ARTERY: ICD-10-CM

## 2019-02-06 DIAGNOSIS — I50.9 HEART FAILURE, UNSPECIFIED: ICD-10-CM

## 2019-02-06 DIAGNOSIS — I16.0 HYPERTENSIVE URGENCY: ICD-10-CM

## 2019-02-06 DIAGNOSIS — Z79.82 LONG TERM (CURRENT) USE OF ASPIRIN: ICD-10-CM

## 2019-02-06 DIAGNOSIS — E78.5 HYPERLIPIDEMIA, UNSPECIFIED: ICD-10-CM

## 2019-02-06 DIAGNOSIS — E11.22 TYPE 2 DIABETES MELLITUS WITH DIABETIC CHRONIC KIDNEY DISEASE: ICD-10-CM

## 2019-02-06 DIAGNOSIS — R42 DIZZINESS AND GIDDINESS: ICD-10-CM

## 2019-02-06 DIAGNOSIS — G45.9 TRANSIENT CEREBRAL ISCHEMIC ATTACK, UNSPECIFIED: ICD-10-CM

## 2019-02-06 DIAGNOSIS — N17.9 ACUTE KIDNEY FAILURE, UNSPECIFIED: ICD-10-CM

## 2019-02-06 DIAGNOSIS — I73.9 PERIPHERAL VASCULAR DISEASE, UNSPECIFIED: ICD-10-CM

## 2019-02-06 DIAGNOSIS — N18.9 CHRONIC KIDNEY DISEASE, UNSPECIFIED: ICD-10-CM

## 2019-02-06 DIAGNOSIS — Z95.1 PRESENCE OF AORTOCORONARY BYPASS GRAFT: ICD-10-CM

## 2019-02-06 DIAGNOSIS — I13.10 HYPERTENSIVE HEART AND CHRONIC KIDNEY DISEASE WITHOUT HEART FAILURE, WITH STAGE 1 THROUGH STAGE 4 CHRONIC KIDNEY DISEASE, OR UNSPECIFIED CHRONIC KIDNEY DISEASE: ICD-10-CM

## 2019-02-06 DIAGNOSIS — M19.90 UNSPECIFIED OSTEOARTHRITIS, UNSPECIFIED SITE: ICD-10-CM

## 2019-02-06 DIAGNOSIS — I25.10 ATHEROSCLEROTIC HEART DISEASE OF NATIVE CORONARY ARTERY WITHOUT ANGINA PECTORIS: ICD-10-CM

## 2019-02-06 DIAGNOSIS — E86.0 DEHYDRATION: ICD-10-CM

## 2019-02-06 DIAGNOSIS — Z98.890 OTHER SPECIFIED POSTPROCEDURAL STATES: ICD-10-CM

## 2019-02-06 DIAGNOSIS — Z88.0 ALLERGY STATUS TO PENICILLIN: ICD-10-CM

## 2019-02-06 DIAGNOSIS — R29.700 NIHSS SCORE 0: ICD-10-CM

## 2019-02-07 LAB
ANION GAP SERPL CALC-SCNC: 15 MMOL/L
BUN SERPL-MCNC: 21 MG/DL
CALCIUM SERPL-MCNC: 9.6 MG/DL
CHLORIDE SERPL-SCNC: 104 MMOL/L
CO2 SERPL-SCNC: 25 MMOL/L
CREAT SERPL-MCNC: 1.16 MG/DL
GLUCOSE SERPL-MCNC: 111 MG/DL
POTASSIUM SERPL-SCNC: 4.6 MMOL/L
SODIUM SERPL-SCNC: 144 MMOL/L

## 2019-02-07 NOTE — ASSESSMENT
[FreeTextEntry1] : 1. PAD: Doing well with complete resolution of RLE claudication after REIA stent.  Good outflow and run-off pulses on exam after manual aspiration for distal embolization during the procedure.\par 2. Carotid bruit: suspicious for carotid stenosis, although there is no neurological symptoms.

## 2019-02-07 NOTE — HISTORY OF PRESENT ILLNESS
[FreeTextEntry1] : Had successful right external iliac self-expandable stent last week.  After post-dilation, there is distal embolization to distal SFA treated with manual aspiration over 6 F guide with restoration of flow to run-off vessels.  Reports feeling "excellent", walking over 10 blocks with complete resolution of claudication  symptom.  Denies h/o CVA, speech/visual disturbances or focal weakness or numbness.

## 2019-02-07 NOTE — REASON FOR VISIT
[Follow-Up - From Hospitalization] : follow-up of a recent hospitalization for [FreeTextEntry2] : right iliac stent

## 2019-02-07 NOTE — PHYSICAL EXAM
[General Appearance - Well Developed] : well developed [General Appearance - Well Nourished] : well nourished [Normal Conjunctiva] : the conjunctiva exhibited no abnormalities [Normal Oral Mucosa] : normal oral mucosa [No Jugular Venous Colby A Waves] : no jugular venous colby A waves [Auscultation Breath Sounds / Voice Sounds] : lungs were clear to auscultation bilaterally [Heart Sounds] : normal S1 and S2 [Murmurs] : no murmurs present [Bowel Sounds] : normal bowel sounds [Abdomen Tenderness] : non-tender [Nail Clubbing] : no clubbing of the fingernails [Cyanosis, Localized] : no localized cyanosis [] : no ischemic changes [FreeTextEntry1] : 2+ right femoral and popliteal pulses, no bruit.  Left CFA access site without hematoma or bruit

## 2019-02-07 NOTE — DISCUSSION/SUMMARY
[___ Week(s)] : [unfilled] week(s) [FreeTextEntry1] : 1. ASA 81 mg po qd indefinitely.  Plavix 75 mg po qd for one month.  Stop Cilastozol.\par 2. Carotid Duplex USG on January 7th.\par 3. Aggressive lipid lowering therapy with target LDL < 70.\par 4. RTC in 3 weeks.\par 5. Will arrange for supervised exercise program once it is available at [facility].

## 2019-02-07 NOTE — REVIEW OF SYSTEMS
[Shortness Of Breath] : no shortness of breath [Dyspnea on exertion] : not dyspnea during exertion [Chest  Pressure] : no chest pressure [Chest Pain] : no chest pain [Cough] : no cough [Wheezing] : no wheezing [Joint Pain] : no joint pain [Joint Swelling] : no joint swelling [Dizziness] : no dizziness

## 2019-02-11 PROBLEM — I65.21 OCCLUSION AND STENOSIS OF RIGHT CAROTID ARTERY: Chronic | Status: ACTIVE | Noted: 2019-02-02

## 2019-02-13 PROCEDURE — 80053 COMPREHEN METABOLIC PANEL: CPT

## 2019-02-13 PROCEDURE — 80061 LIPID PANEL: CPT

## 2019-02-13 PROCEDURE — C1889: CPT

## 2019-02-13 PROCEDURE — 83735 ASSAY OF MAGNESIUM: CPT

## 2019-02-13 PROCEDURE — 84300 ASSAY OF URINE SODIUM: CPT

## 2019-02-13 PROCEDURE — 84156 ASSAY OF PROTEIN URINE: CPT

## 2019-02-13 PROCEDURE — 85730 THROMBOPLASTIN TIME PARTIAL: CPT

## 2019-02-13 PROCEDURE — C1894: CPT

## 2019-02-13 PROCEDURE — C1725: CPT

## 2019-02-13 PROCEDURE — 84133 ASSAY OF URINE POTASSIUM: CPT

## 2019-02-13 PROCEDURE — 37215 TRANSCATH STENT CCA W/EPS: CPT | Mod: RT

## 2019-02-13 PROCEDURE — 84105 ASSAY OF URINE PHOSPHORUS: CPT

## 2019-02-13 PROCEDURE — 93005 ELECTROCARDIOGRAM TRACING: CPT

## 2019-02-13 PROCEDURE — 85027 COMPLETE CBC AUTOMATED: CPT

## 2019-02-13 PROCEDURE — 84540 ASSAY OF URINE/UREA-N: CPT

## 2019-02-13 PROCEDURE — 76770 US EXAM ABDO BACK WALL COMP: CPT

## 2019-02-13 PROCEDURE — 80048 BASIC METABOLIC PNL TOTAL CA: CPT

## 2019-02-13 PROCEDURE — 82962 GLUCOSE BLOOD TEST: CPT

## 2019-02-13 PROCEDURE — C1769: CPT

## 2019-02-13 PROCEDURE — 85610 PROTHROMBIN TIME: CPT

## 2019-02-13 PROCEDURE — 36223 PLACE CATH CAROTID/INOM ART: CPT | Mod: LT

## 2019-02-13 PROCEDURE — 82436 ASSAY OF URINE CHLORIDE: CPT

## 2019-02-13 PROCEDURE — 82570 ASSAY OF URINE CREATININE: CPT

## 2019-02-13 PROCEDURE — 36415 COLL VENOUS BLD VENIPUNCTURE: CPT

## 2019-02-13 PROCEDURE — 85025 COMPLETE CBC W/AUTO DIFF WBC: CPT

## 2019-02-13 PROCEDURE — 83036 HEMOGLOBIN GLYCOSYLATED A1C: CPT

## 2019-02-13 PROCEDURE — 36225 PLACE CATH SUBCLAVIAN ART: CPT | Mod: LT

## 2019-02-15 ENCOUNTER — APPOINTMENT (OUTPATIENT)
Dept: HEART AND VASCULAR | Facility: CLINIC | Age: 64
End: 2019-02-15

## 2019-02-15 VITALS — DIASTOLIC BLOOD PRESSURE: 70 MMHG | SYSTOLIC BLOOD PRESSURE: 124 MMHG

## 2019-02-15 VITALS — SYSTOLIC BLOOD PRESSURE: 124 MMHG | DIASTOLIC BLOOD PRESSURE: 80 MMHG

## 2019-02-19 ENCOUNTER — APPOINTMENT (OUTPATIENT)
Dept: HEART AND VASCULAR | Facility: CLINIC | Age: 64
End: 2019-02-19

## 2019-02-25 ENCOUNTER — APPOINTMENT (OUTPATIENT)
Dept: HEART AND VASCULAR | Facility: CLINIC | Age: 64
End: 2019-02-25
Payer: MEDICARE

## 2019-02-25 ENCOUNTER — APPOINTMENT (OUTPATIENT)
Dept: NEPHROLOGY | Facility: CLINIC | Age: 64
End: 2019-02-25
Payer: MEDICARE

## 2019-02-25 ENCOUNTER — LABORATORY RESULT (OUTPATIENT)
Age: 64
End: 2019-02-25

## 2019-02-25 VITALS
HEART RATE: 90 BPM | DIASTOLIC BLOOD PRESSURE: 100 MMHG | OXYGEN SATURATION: 98 % | BODY MASS INDEX: 30.31 KG/M2 | TEMPERATURE: 98.7 F | SYSTOLIC BLOOD PRESSURE: 160 MMHG | WEIGHT: 200 LBS | HEIGHT: 68 IN

## 2019-02-25 VITALS — DIASTOLIC BLOOD PRESSURE: 84 MMHG | HEART RATE: 66 BPM | SYSTOLIC BLOOD PRESSURE: 142 MMHG

## 2019-02-25 VITALS — DIASTOLIC BLOOD PRESSURE: 96 MMHG | SYSTOLIC BLOOD PRESSURE: 158 MMHG

## 2019-02-25 VITALS — SYSTOLIC BLOOD PRESSURE: 159 MMHG | DIASTOLIC BLOOD PRESSURE: 109 MMHG

## 2019-02-25 PROCEDURE — 93000 ELECTROCARDIOGRAM COMPLETE: CPT

## 2019-02-25 PROCEDURE — 93880 EXTRACRANIAL BILAT STUDY: CPT

## 2019-02-25 PROCEDURE — 93923 UPR/LXTR ART STDY 3+ LVLS: CPT

## 2019-02-25 PROCEDURE — 99214 OFFICE O/P EST MOD 30 MIN: CPT

## 2019-02-25 PROCEDURE — 99213 OFFICE O/P EST LOW 20 MIN: CPT

## 2019-02-25 RX ORDER — ATORVASTATIN CALCIUM 40 MG/1
40 TABLET, FILM COATED ORAL DAILY
Qty: 90 | Refills: 0 | Status: COMPLETED | COMMUNITY
Start: 2019-02-25

## 2019-02-25 RX ORDER — CILOSTAZOL 100 MG/1
100 TABLET ORAL DAILY
Refills: 0 | Status: DISCONTINUED | COMMUNITY
End: 2019-02-25

## 2019-02-25 NOTE — ASSESSMENT
[FreeTextEntry1] : 1. Carotid Stenosis: s/p carotid stent without post-procedure CVA.  BP is gradually returning to baseline.  Malasise, lethargy and memory loss may be related to Clonidine use.\par 2. PAD: s/p R iliac stent without further claudication.\par 3. Elevated BP: will resume regular BP meds and taper Clonidine to off.\par \par Plan:\par 1. Follow up Carotid Duplex USG today.\par 2. Increase Metoprolol ER to 100 mg po qd.\par 3. Restart Norvasc 10 mg po qd.\par 4. Decrease Clonidine to 0.05 mg po bid.\par 5. Check blood for CBC/C7/LFT's.\par 6. If LFT's are normal, will increase Atorvastatin to 80 mg po qd.\par 7. May proceed with prostate biopsy after stopping ASA and Plavix for 5 days.  Resume Plavix 75 mg po qd POD#1.\par 8. RTC in one month.

## 2019-02-25 NOTE — REVIEW OF SYSTEMS
[Fever] : no fever [Chills] : no chills [Blurry Vision] : no blurred vision [Earache] : no earache [Shortness Of Breath] : no shortness of breath [Chest Pain] : no chest pain [Cough] : no cough [Wheezing] : no wheezing [Abdominal Pain] : no abdominal pain [Nausea] : no nausea [Vomiting] : no vomiting [Urinary Frequency] : no change in urinary frequency [Impotence] : no impotence [Joint Pain] : no joint pain [Joint Swelling] : no joint swelling [Skin: A Rash] : no rash: [Skin Lesions] : no skin lesions [Dizziness] : no dizziness [Convulsions] : no convulsions [Confusion] : no confusion was observed [Memory Lapses Or Loss] : memory lapses or loss [Excessive Thirst] : no polydipsia [Easy Bleeding] : no tendency for easy bleeding [Easy Bruising] : no tendency for easy bruising

## 2019-02-25 NOTE — HISTORY OF PRESENT ILLNESS
[FreeTextEntry1] : Feeling well as far as neurological symptom is concerned with normal strength in all extremities without weakness or numbness.  No headache, speech or visual disturbances.  No more claudication.  Complains of general malaise, lethargy and decreased will to move, without depressive mood.  Scheduled for prostate biopsy in early March.

## 2019-02-25 NOTE — ASSESSMENT
[FreeTextEntry1] : # CKD stage 3 stable.\par * The patient has been counseled that chronic kidney disease is a significant condition and regular office followup with me (at least every 3 weeks for now) is essential for monitoring, and that it is their responsibility to make a follow up appointment. The patient has been counseled never to stop taking their medications without discussing it with me or another doctor. The patient has been counseled on risk of acute renal failure and instructed to immediately call and speak with me or go immediately to ER with any severe symptoms, nausea, vomiting, diarrhea, chest pain, or shortness of breath. The patient has been counseled on avoiding NSAIDs. Reducing or avoiding red meat and starting folate 800 mg qd has been advised. A counseling information sheet has been given. All their questions were answered.\par * Check U/A, urine albumin.\par \par # HTN uncontrolled.\par * Amlodipine was restarted today. Restarting lisinopril and adding diuretic are future options. \par * The patient's blood pressure was checked with the [employer] [id number] using the SPRINT trial protocol after sitting quietly in an empty room with arm supported, back supported, and feet on the floor for 5 minutes. The average of 3 readings were taken. \par * The patient has been  advised to check their BP at home with an automatic arm cuff, write down the readings, and reach me directly on the phone immediately if they are persistently > 180 systolic or if SBP is less than 100 or if lightheadedness develops. They were advised to bring in all blood pressure readings and medications next visit.\par * The patient has been counseled that they have a a significant medical condition and regular office followup (at least every 4 months for now) is essential for monitoring, and that it is their responsibility to make follow up appointments. The patient also understands that they must never stop or change any medications without discussing this with me (or another physician). A counseling information sheet has been given. All their questions were answered. \par \par # CAD.\par * Follow up closely with Dr. Agosto.\par \par

## 2019-02-25 NOTE — HISTORY OF PRESENT ILLNESS
[FreeTextEntry1] : Kindly referred by Dr. Agosto in followup for recent admission for HTN and CKD.\par \par * His creatinine had recently increased to 1.7 from a baseline of 1.3 related to carotid stenting. His last creatinine was 1.3 3 weeks ago. A renal ultrasound was unremarkable. Lisinopril 40 had been stopped during his hospitalization due to increased creatinine. \par \par HTN uncontrolled.  - 160s. No lightheadedness. Compliant with medications. Following low salt diet. Amlodipine 10 mg daily was restarted today. \par \par He is being followed by his urologist for an elevated PSA and urinary frequency. \par

## 2019-02-25 NOTE — CONSULT LETTER
[FreeTextEntry1] : Dear Ginger,\par \par I had the pleasure of seeing Rik Agudelo in followup for kidney disease. My note is attached.\par \par Thank you for the opportunity to participate in the care of your patient.\par \par Please call me on my cell phone at [phone] or in the office at [phone] if you have any questions.\par \par Best regards,\par \par Delvis\par \par Delvis Fregoso MD, FACP, FASN\par [email or web address]\par Office: [phone]\par Mobile: [phone]

## 2019-02-25 NOTE — PHYSICAL EXAM
[General Appearance - Well Developed] : well developed [General Appearance - Well Nourished] : well nourished [Normal Conjunctiva] : the conjunctiva exhibited no abnormalities [Normal Oral Mucosa] : normal oral mucosa [JVD Elevated _____cm] : JVD elevated [unfilled] ~U cm above clavicle [FreeTextEntry1] : No bruit [Heart Sounds] : normal S1 and S2 [Murmurs] : no murmurs present [Respiration, Rhythm And Depth] : normal respiratory rhythm and effort [Auscultation Breath Sounds / Voice Sounds] : lungs were clear to auscultation bilaterally [Bowel Sounds] : normal bowel sounds [Abdomen Tenderness] : non-tender [Abnormal Walk] : normal gait [Nail Clubbing] : no clubbing of the fingernails [Cyanosis, Localized] : no localized cyanosis [Skin Color & Pigmentation] : normal skin color and pigmentation [Skin Turgor] : normal skin turgor [Normal Mental Status] : the patient was oriented to person, place and time [Normal] : coordination was normal

## 2019-02-28 ENCOUNTER — APPOINTMENT (OUTPATIENT)
Dept: NEUROLOGY | Facility: CLINIC | Age: 64
End: 2019-02-28
Payer: MEDICARE

## 2019-02-28 VITALS
HEART RATE: 76 BPM | BODY MASS INDEX: 30.31 KG/M2 | WEIGHT: 200 LBS | HEIGHT: 68 IN | OXYGEN SATURATION: 97 % | SYSTOLIC BLOOD PRESSURE: 147 MMHG | DIASTOLIC BLOOD PRESSURE: 90 MMHG

## 2019-02-28 DIAGNOSIS — G45.9 TRANSIENT CEREBRAL ISCHEMIC ATTACK, UNSPECIFIED: ICD-10-CM

## 2019-02-28 PROCEDURE — 99214 OFFICE O/P EST MOD 30 MIN: CPT

## 2019-02-28 RX ORDER — LISINOPRIL 40 MG/1
40 TABLET ORAL DAILY
Refills: 0 | Status: DISCONTINUED | COMMUNITY
End: 2019-02-28

## 2019-02-28 NOTE — HISTORY OF PRESENT ILLNESS
[FreeTextEntry1] : Patient presents for HFU of 2.2.19 admission with symptoms of blurred vision, word finding difficulty, and blurred vision x 2 hours. He attributes this episode to HTN as he missed his dose of Clonidine that day.\par \par CTH showed lacunar infarcts age indeterminate. CT perfusion and CTA head/neck were negative. Started on aspirin 325mg / Continued on home plavix 75mg po qd, lipitor 40mg po qd. \par \par \par His PMH is significant for CABG x4 (2011), PVD, s/p placement of R ICA stent 1.2019. He continues to follow with Dr. Agosto who will manage anti-platelets as per patient.\par \par Of note, he reports elevated PSA and is following with urology. Planning for biopsy once anti-platelets are decreased.

## 2019-02-28 NOTE — PHYSICAL EXAM
[FreeTextEntry1] : Constitutional: \par -Alert, in no acute distress and well nourished\par Psychiatric: \par -Oriented to person, place, and time\par -Insight and judgment intact\par -Normal affect \par Neurologic: \par -Memory: short term, remote and recent memory intact \par -Language: fluency intact, no dysarthria \par Cranial Nerves: \par -Visual acuity intact bilaterally\par -Visual fields full to confrontation\par -Pupils equal round and reactive to light\par -Extraocular motion intact\par -Facial sensation intact symmetrically\par -Face symmetrical\par -Hearing grossly intact bilaterally\par -Tongue and palate midline\par -Had turning and shoulder shrug symmetric \par Motor: \par -Muscle tone normal in all four extremities\par -Muscle strength normal in all four extremities\par -No pronator drift on the right. no pronator drift on the left\par Sensory exam\par -Light touch intact\par -Pain and temperature intact \par -Vibration intact \par Coordination:. \par -Normal gait\par -Balance intact. \par -Romberg's sign negative\par -No past-pointing\par -No tremor present\par - No dysmetria on finger to nose or heel to shin.\par Eyes: \par -Sclera and conjunctiva normal\par -Pupils equal in size, round, reactive to light, with normal accommodation\par -Extraocular movements intact \par -Full visual field \par Musculoskeletal: \par -Normal gait \par -Muscle strength and tone normal. \par Skin:\par -Normal skin color and pigmentation\par -Normal skin turgor \par -No skin lesions\par Respiratory:\par -No respiratory distress

## 2019-02-28 NOTE — DISCUSSION/SUMMARY
[FreeTextEntry1] : Patient is neurologically stable. \par \par Dx: TIA \par \par Plan: C/W antiplatelet (Ok for either ASA or plavix as both were therapeutic on accumetrics)\par \par /90 - follow with cardiology for BP management. Patient reports changes are being made to anto-HTN this week \par \par RTC 4 months or earlier as symptoms dictate \par \par \par \par

## 2019-03-03 LAB
APPEARANCE: CLEAR
BILIRUBIN URINE: NEGATIVE
BLOOD URINE: NEGATIVE
COLOR: YELLOW
CREAT SPEC-SCNC: 120 MG/DL
CREAT SPEC-SCNC: 120 MG/DL
CREAT/PROT UR: 1.9 RATIO
GLUCOSE QUALITATIVE U: NEGATIVE
IGA 24H UR QL IFE: NORMAL
KETONES URINE: NEGATIVE
LEUKOCYTE ESTERASE URINE: NEGATIVE
MICROALBUMIN 24H UR DL<=1MG/L-MCNC: 153.7 MG/DL
MICROALBUMIN/CREAT 24H UR-RTO: 1276 MG/G
NITRITE URINE: NEGATIVE
PH URINE: 6
PROT UR-MCNC: 230 MG/DL
PROTEIN URINE: ABNORMAL
SPECIFIC GRAVITY URINE: 1.02
UROBILINOGEN URINE: NORMAL

## 2019-03-12 ENCOUNTER — RESULT REVIEW (OUTPATIENT)
Age: 64
End: 2019-03-12

## 2019-03-12 LAB
ALT SERPL-CCNC: 22 U/L
ANION GAP SERPL CALC-SCNC: 13 MMOL/L
AST SERPL-CCNC: 18 U/L
BASOPHILS # BLD AUTO: 0.05 K/UL
BASOPHILS NFR BLD AUTO: 0.6 %
BUN SERPL-MCNC: 30 MG/DL
CALCIUM SERPL-MCNC: 9.4 MG/DL
CHLORIDE SERPL-SCNC: 106 MMOL/L
CO2 SERPL-SCNC: 25 MMOL/L
CREAT SERPL-MCNC: 1.23 MG/DL
EOSINOPHIL # BLD AUTO: 0.18 K/UL
EOSINOPHIL NFR BLD AUTO: 2.2 %
GLUCOSE SERPL-MCNC: 113 MG/DL
HCT VFR BLD CALC: 53.3 %
HGB BLD-MCNC: 15.5 G/DL
IMM GRANULOCYTES NFR BLD AUTO: 0.4 %
LYMPHOCYTES # BLD AUTO: 1.49 K/UL
LYMPHOCYTES NFR BLD AUTO: 18.3 %
MAN DIFF?: NORMAL
MCHC RBC-ENTMCNC: 27.1 PG
MCHC RBC-ENTMCNC: 29.1 GM/DL
MCV RBC AUTO: 93.3 FL
MONOCYTES # BLD AUTO: 0.92 K/UL
MONOCYTES NFR BLD AUTO: 11.3 %
NEUTROPHILS # BLD AUTO: 5.46 K/UL
NEUTROPHILS NFR BLD AUTO: 67.2 %
PLATELET # BLD AUTO: 179 K/UL
POTASSIUM SERPL-SCNC: 3.9 MMOL/L
RBC # BLD: 5.71 M/UL
RBC # FLD: 14.7 %
SODIUM SERPL-SCNC: 144 MMOL/L
WBC # FLD AUTO: 8.13 K/UL

## 2019-03-14 ENCOUNTER — TRANSCRIPTION ENCOUNTER (OUTPATIENT)
Age: 64
End: 2019-03-14

## 2019-03-18 ENCOUNTER — APPOINTMENT (OUTPATIENT)
Dept: NEPHROLOGY | Facility: CLINIC | Age: 64
End: 2019-03-18
Payer: MEDICARE

## 2019-03-18 VITALS — DIASTOLIC BLOOD PRESSURE: 79 MMHG | HEART RATE: 75 BPM | SYSTOLIC BLOOD PRESSURE: 143 MMHG

## 2019-03-18 VITALS — WEIGHT: 196 LBS | BODY MASS INDEX: 29.8 KG/M2

## 2019-03-18 VITALS — SYSTOLIC BLOOD PRESSURE: 173 MMHG | DIASTOLIC BLOOD PRESSURE: 111 MMHG

## 2019-03-18 PROCEDURE — 99214 OFFICE O/P EST MOD 30 MIN: CPT

## 2019-03-18 RX ORDER — CLONIDINE HYDROCHLORIDE 0.1 MG/1
0.1 TABLET ORAL TWICE DAILY
Refills: 0 | Status: DISCONTINUED | COMMUNITY
End: 2019-03-18

## 2019-03-18 NOTE — PHYSICAL EXAM
[General Appearance - Alert] : alert [General Appearance - In No Acute Distress] : in no acute distress [PERRL With Normal Accommodation] : pupils were equal in size, round, and reactive to light [Sclera] : the sclera and conjunctiva were normal [Extraocular Movements] : extraocular movements were intact [Outer Ear] : the ears and nose were normal in appearance [Neck Appearance] : the appearance of the neck was normal [Oropharynx] : the oropharynx was normal [Neck Cervical Mass (___cm)] : no neck mass was observed [Jugular Venous Distention Increased] : there was no jugular-venous distention [Thyroid Diffuse Enlargement] : the thyroid was not enlarged [Thyroid Nodule] : there were no palpable thyroid nodules [Auscultation Breath Sounds / Voice Sounds] : lungs were clear to auscultation bilaterally [Heart Rate And Rhythm] : heart rate was normal and rhythm regular [Heart Sounds] : normal S1 and S2 [Heart Sounds Gallop] : no gallops [Murmurs] : no murmurs [Heart Sounds Pericardial Friction Rub] : no pericardial rub [Edema] : there was no peripheral edema [Veins - Varicosity Changes] : there were no varicosital changes [Bowel Sounds] : normal bowel sounds [Abdomen Soft] : soft [Abdomen Tenderness] : non-tender [Cervical Lymph Nodes Enlarged Posterior Bilaterally] : posterior cervical [Abdomen Mass (___ Cm)] : no abdominal mass palpated [Cervical Lymph Nodes Enlarged Anterior Bilaterally] : anterior cervical [Supraclavicular Lymph Nodes Enlarged Bilaterally] : supraclavicular [Nail Clubbing] : no clubbing  or cyanosis of the fingernails [Abnormal Walk] : normal gait [Musculoskeletal - Swelling] : no joint swelling seen [Motor Tone] : muscle strength and tone were normal [Skin Color & Pigmentation] : normal skin color and pigmentation [Skin Turgor] : normal skin turgor [] : no rash [Oriented To Time, Place, And Person] : oriented to person, place, and time [Affect] : the affect was normal [Impaired Insight] : insight and judgment were intact

## 2019-03-18 NOTE — ASSESSMENT
[FreeTextEntry1] : # HTN uncontrolled.\par * Stop clonidine.\par * Start lisinopril 5 mg daily.\par  * The patient's blood pressure was checked with the [employer] [id number]-[id number] using the SPRINT trial protocol after sitting quietly in an empty room with arm supported, back supported, and feet on the floor for 5 minutes. The average of 3 readings were taken. \par * The patient has been  advised to check their BP at home with an automatic arm cuff, write down the readings, and reach me directly on the phone immediately if they are persistently > 180 systolic or if SBP is less than 100 or if lightheadedness develops. They were advised to bring in all blood pressure readings and medications next visit.\par * The patient has been counseled that they have a a significant medical condition and regular office followup (at least every 2 weeeks for now) is essential for monitoring, and that it is their responsibility to make follow up appointments.\par * The patient also understands that they must never stop or change any medications without discussing this with me (or another physician). \par * A counseling information sheet has been given and they were provided sufficient time to review this sheet. All their questions were answered.\par \par # CKD stage 3.\par * * The patient has been counseled that chronic kidney disease is a significant condition and regular office followup with me (at least every 2 weekss for now) is essential for monitoring, and that it is their responsibility to make a follow up appointment.\par * The patient has been counseled never to stop taking their medications without discussing it with me or another doctor.\par * The patient has been counseled on risk of acute renal failure and instructed to immediately call and speak with me or go immediately to ER with any severe symptoms, nausea, vomiting, diarrhea, chest pain, or shortness of breath.\par * The patient has been counseled on avoiding NSAIDs.\par * Reducing or avoiding red meat, following a relatively low oxalate diet, and starting folate 800 mg qd has been advised.\par * A counseling information sheet has been given and they were provided sufficient time to review this. All their questions were answered.\par \par # Proteinuria.\par * Recheck proteinuria on lisinopril. \par \par # CAD.\par * Aim for SBP < 125 - 130.

## 2019-03-18 NOTE — HISTORY OF PRESENT ILLNESS
[FreeTextEntry1] : Kindly referred by Dr. Agosto in followup for recent admission for HTN and CKD.\par \par * CKD improved. Creatinine decreased to 1.3. * Proteinuria 1.9 g/g. * HTN uncontrolld. No lightheadedness. Compliant with medications. Following low salt diet. Lisinopril had been previously stopped due to increased creatinine. He is taking clonidine .05 bid. \par \par Previous history (03Reh51): * His creatinine had recently increased to 1.7 from a baseline of 1.3 related to carotid stenting. His last creatinine was 1.3 3 weeks ago. A renal ultrasound was unremarkable. Lisinopril 40 had been stopped during his hospitalization due to increased creatinine. \par \par HTN uncontrolled.  - 160s. No lightheadedness. Compliant with medications. Following low salt diet. Amlodipine 10 mg daily was restarted today. \par \par He is being followed by his urologist for an elevated PSA and urinary frequency. \par

## 2019-04-01 ENCOUNTER — APPOINTMENT (OUTPATIENT)
Dept: HEART AND VASCULAR | Facility: CLINIC | Age: 64
End: 2019-04-01

## 2019-04-08 ENCOUNTER — APPOINTMENT (OUTPATIENT)
Dept: NEPHROLOGY | Facility: CLINIC | Age: 64
End: 2019-04-08
Payer: MEDICARE

## 2019-04-08 ENCOUNTER — LABORATORY RESULT (OUTPATIENT)
Age: 64
End: 2019-04-08

## 2019-04-08 VITALS — SYSTOLIC BLOOD PRESSURE: 121 MMHG | HEART RATE: 80 BPM | DIASTOLIC BLOOD PRESSURE: 84 MMHG

## 2019-04-08 VITALS — SYSTOLIC BLOOD PRESSURE: 138 MMHG | DIASTOLIC BLOOD PRESSURE: 93 MMHG

## 2019-04-08 PROCEDURE — 36415 COLL VENOUS BLD VENIPUNCTURE: CPT

## 2019-04-08 PROCEDURE — 99214 OFFICE O/P EST MOD 30 MIN: CPT | Mod: 25

## 2019-04-08 NOTE — HISTORY OF PRESENT ILLNESS
[FreeTextEntry1] : Kindly referred by Dr. Agosto in followup for recent admission for HTN and CKD.\par \par * HTN previously uncontrolled. Clonidine stopped. Lisinopril started last visit. Compliant with medications. No lightheadedness. * 1.9 g/g proteinuria prior to visit. * Obesity stable. * CKD stable, creatinine 1.23. \par \par Previous history (18Mar19): * CKD improved. Creatinine decreased to 1.3. * Proteinuria 1.9 g/g. * HTN uncontrolld. No lightheadedness. Compliant with medications. Following low salt diet. Lisinopril had been previously stopped due to increased creatinine. He is taking clonidine .05 bid. \par \par Previous history (25Feb19): * His creatinine had recently increased to 1.7 from a baseline of 1.3 related to carotid stenting. His last creatinine was 1.3 3 weeks ago. A renal ultrasound was unremarkable. Lisinopril 40 had been stopped during his hospitalization due to increased creatinine. \par \par HTN uncontrolled.  - 160s. No lightheadedness. Compliant with medications. Following low salt diet. Amlodipine 10 mg daily was restarted today. \par \par He is being followed by his urologist for an elevated PSA and urinary frequency. \par

## 2019-04-08 NOTE — ASSESSMENT
[FreeTextEntry1] : # HTN controlled.\par * The patient's blood pressure was checked with the [employer] [id number]-[id number] using the SPRINT trial protocol after sitting quietly in an empty room with arm supported, back supported, and feet on the floor for 5 minutes. The average of 3 readings were taken. \par * The patient has been advised to check their BP at home with an automatic arm cuff, write down the readings, and reach me directly on the phone immediately if they are persistently > 180 systolic or if SBP is less than 100 or if lightheadedness develops. They were advised to bring in all blood pressure readings and medications next visit.\par * The patient has been counseled that they have a a significant medical condition and regular office followup (at least every 1-2 month for now) is essential for monitoring, and that it is their responsibility to make follow up appointments.\par * The patient also has been counseled that they must never stop or change any medications without discussing this with me (or another physician). \par * A counseling information sheet has been given and they were provided sufficient time to review this sheet. All their questions were answered.\par \par # CKD stage 3.\par * The patient has been counseled that chronic kidney disease is a significant condition and regular office followup with me (at least every 1-2 month for now) is essential for monitoring, and that it is their responsibility to make a follow up appointment.\par * The patient has been counseled never to stop taking their medications without discussing it with me or another doctor.\par * The patient has been counseled on risk of acute renal failure and instructed to immediately call and speak with me or go immediately to ER with any severe symptoms, nausea, vomiting, diarrhea, chest pain, or shortness of breath.\par * The patient has been counseled on avoiding NSAIDs.\par * Reducing or avoiding red meat, following a relatively low oxalate diet, and starting folate 800 mg qd has been advised.\par * A counseling information sheet has been given and they were provided sufficient time to review this. All their questions were answered.\par \par # Proteinuria.\par * Recheck urine protein.\par \par # CAD.\par * Aim for SBP < 125 - 130 at least. \par \par # Hyperlipidemia.\par * Continue lipitor. \par

## 2019-04-08 NOTE — CONSULT LETTER
[FreeTextEntry1] : Dear Mikaela,\par \par I had the pleasure of seeing Saleem Agudelo in followup for kidney disease. My note is attached.\par \par Thank you for the opportunity to participate in the care of your patient.\par \par Please call me on my cell phone at [phone] or in the office at [phone] if you have any questions.\par \par Best regards,\par \par Delvis\par \par Delvis Fregoso MD, FACP, FASN\par [email or web address]\par Office: [phone]\par Mobile: [phone]

## 2019-04-13 LAB
25(OH)D3 SERPL-MCNC: 27.3 NG/ML
ALBUMIN SERPL ELPH-MCNC: 4.6 G/DL
ALP BLD-CCNC: 82 U/L
ALT SERPL-CCNC: 42 U/L
ANION GAP SERPL CALC-SCNC: 14 MMOL/L
APPEARANCE: CLEAR
AST SERPL-CCNC: 30 U/L
BASOPHILS # BLD AUTO: 0.08 K/UL
BASOPHILS NFR BLD AUTO: 0.9 %
BILIRUB SERPL-MCNC: 0.4 MG/DL
BILIRUBIN URINE: NEGATIVE
BLOOD URINE: NEGATIVE
BUN SERPL-MCNC: 28 MG/DL
CALCIUM SERPL-MCNC: 9.5 MG/DL
CALCIUM SERPL-MCNC: 9.5 MG/DL
CHLORIDE SERPL-SCNC: 103 MMOL/L
CHOLEST SERPL-MCNC: 170 MG/DL
CHOLEST/HDLC SERPL: 3.9 RATIO
CO2 SERPL-SCNC: 23 MMOL/L
COLOR: YELLOW
CREAT SERPL-MCNC: 1.21 MG/DL
CREAT SPEC-SCNC: 73 MG/DL
CREAT SPEC-SCNC: 73 MG/DL
CREAT/PROT UR: 0.9 RATIO
EOSINOPHIL # BLD AUTO: 0.12 K/UL
EOSINOPHIL NFR BLD AUTO: 1.4 %
ESTIMATED AVERAGE GLUCOSE: 126 MG/DL
FERRITIN SERPL-MCNC: 58 NG/ML
GLUCOSE QUALITATIVE U: NEGATIVE
GLUCOSE SERPL-MCNC: 118 MG/DL
HBA1C MFR BLD HPLC: 6 %
HCT VFR BLD CALC: 54.5 %
HDLC SERPL-MCNC: 44 MG/DL
HGB BLD-MCNC: 16.3 G/DL
IMM GRANULOCYTES NFR BLD AUTO: 0.6 %
KETONES URINE: NEGATIVE
LDLC SERPL CALC-MCNC: 108 MG/DL
LEUKOCYTE ESTERASE URINE: NEGATIVE
LYMPHOCYTES # BLD AUTO: 1.62 K/UL
LYMPHOCYTES NFR BLD AUTO: 19.1 %
MAGNESIUM SERPL-MCNC: 1.9 MG/DL
MAN DIFF?: NORMAL
MCHC RBC-ENTMCNC: 26.8 PG
MCHC RBC-ENTMCNC: 29.9 GM/DL
MCV RBC AUTO: 89.6 FL
MICROALBUMIN 24H UR DL<=1MG/L-MCNC: 45.8 MG/DL
MICROALBUMIN/CREAT 24H UR-RTO: 622 MG/G
MONOCYTES # BLD AUTO: 1 K/UL
MONOCYTES NFR BLD AUTO: 11.8 %
NEUTROPHILS # BLD AUTO: 5.63 K/UL
NEUTROPHILS NFR BLD AUTO: 66.2 %
NITRITE URINE: NEGATIVE
PARATHYROID HORMONE INTACT: 69 PG/ML
PH URINE: 6
PHOSPHATE SERPL-MCNC: 3.6 MG/DL
PLATELET # BLD AUTO: 186 K/UL
POTASSIUM SERPL-SCNC: 4.7 MMOL/L
PROT SERPL-MCNC: 7.5 G/DL
PROT UR-MCNC: 64 MG/DL
PROTEIN URINE: ABNORMAL
RBC # BLD: 6.08 M/UL
RBC # FLD: 14.9 %
SODIUM SERPL-SCNC: 140 MMOL/L
SPECIFIC GRAVITY URINE: 1.01
T4 FREE SERPL-MCNC: 1.1 NG/DL
TRIGL SERPL-MCNC: 88 MG/DL
TSH SERPL-ACNC: 2.55 UIU/ML
URATE SERPL-MCNC: 8.1 MG/DL
UROBILINOGEN URINE: NORMAL
VIT B12 SERPL-MCNC: 778 PG/ML
WBC # FLD AUTO: 8.5 K/UL

## 2019-06-03 ENCOUNTER — APPOINTMENT (OUTPATIENT)
Dept: NEPHROLOGY | Facility: CLINIC | Age: 64
End: 2019-06-03
Payer: MEDICARE

## 2019-06-03 VITALS — SYSTOLIC BLOOD PRESSURE: 108 MMHG | HEART RATE: 60 BPM | DIASTOLIC BLOOD PRESSURE: 65 MMHG

## 2019-06-03 DIAGNOSIS — G47.00 INSOMNIA, UNSPECIFIED: ICD-10-CM

## 2019-06-03 DIAGNOSIS — I25.5 ISCHEMIC CARDIOMYOPATHY: ICD-10-CM

## 2019-06-03 DIAGNOSIS — I42.0 ISCHEMIC CARDIOMYOPATHY: ICD-10-CM

## 2019-06-03 PROCEDURE — 99214 OFFICE O/P EST MOD 30 MIN: CPT

## 2019-06-03 NOTE — PHYSICAL EXAM
[General Appearance - Alert] : alert [General Appearance - In No Acute Distress] : in no acute distress [Sclera] : the sclera and conjunctiva were normal [Extraocular Movements] : extraocular movements were intact [PERRL With Normal Accommodation] : pupils were equal in size, round, and reactive to light [Outer Ear] : the ears and nose were normal in appearance [Oropharynx] : the oropharynx was normal [Neck Appearance] : the appearance of the neck was normal [Neck Cervical Mass (___cm)] : no neck mass was observed [Thyroid Diffuse Enlargement] : the thyroid was not enlarged [Jugular Venous Distention Increased] : there was no jugular-venous distention [Thyroid Nodule] : there were no palpable thyroid nodules [Heart Rate And Rhythm] : heart rate was normal and rhythm regular [Auscultation Breath Sounds / Voice Sounds] : lungs were clear to auscultation bilaterally [Heart Sounds Gallop] : no gallops [Heart Sounds] : normal S1 and S2 [Murmurs] : no murmurs [Heart Sounds Pericardial Friction Rub] : no pericardial rub [Edema] : there was no peripheral edema [Abdomen Soft] : soft [Veins - Varicosity Changes] : there were no varicosital changes [Abdomen Tenderness] : non-tender [Bowel Sounds] : normal bowel sounds [Abdomen Mass (___ Cm)] : no abdominal mass palpated [Cervical Lymph Nodes Enlarged Posterior Bilaterally] : posterior cervical [Abnormal Walk] : normal gait [Supraclavicular Lymph Nodes Enlarged Bilaterally] : supraclavicular [Cervical Lymph Nodes Enlarged Anterior Bilaterally] : anterior cervical [Nail Clubbing] : no clubbing  or cyanosis of the fingernails [Motor Tone] : muscle strength and tone were normal [Musculoskeletal - Swelling] : no joint swelling seen [Oriented To Time, Place, And Person] : oriented to person, place, and time [Skin Color & Pigmentation] : normal skin color and pigmentation [] : no rash [Skin Turgor] : normal skin turgor [Impaired Insight] : insight and judgment were intact [Affect] : the affect was normal

## 2019-06-03 NOTE — REVIEW OF SYSTEMS
[Recent Weight Loss (___ Lbs)] : recent [unfilled] ~Ulb weight loss [SOB on Exertion] : shortness of breath during exertion [Feelings Of Weakness] : feelings of weakness [Negative] : Heme/Lymph

## 2019-06-03 NOTE — ASSESSMENT
[FreeTextEntry1] : # HTN controlled.\par * The patient's blood pressure was checked with the [employer] [id number]-907XL using the SPRINT trial protocol after sitting quietly in an empty room with arm supported, back supported, and feet on the floor for 5 minutes. The average of 3 readings were taken. \par * The patient has been advised to check their BP at home with an automatic arm cuff, write down the readings, and reach me directly on the phone immediately if they are persistently > 180 systolic or if SBP is less than 100 or if lightheadedness develops. They were advised to bring in all blood pressure readings and medications next visit.\par * The patient has been counseled that they have a a significant medical condition and regular office followup (at least every 4 months for now) is essential for monitoring, and that it is their responsibility to make follow up appointments.\par * The patient also has been counseled that they must never stop or change any medications without discussing this with me (or another physician). \par * A counseling information sheet has been given and they were provided sufficient time to review this sheet. All their questions were answered.\par \par # CKD stage 3.\par * The patient has been counseled that chronic kidney disease is a significant condition and regular office followup with me (at least every 4 months for now) is essential for monitoring, and that it is their responsibility to make a follow up appointment.\par * The patient has been counseled never to stop taking their medications without discussing it with me or another doctor.\par * The patient has been counseled on risk of acute renal failure and instructed to immediately call and speak with me or go immediately to ER with any severe symptoms, nausea, vomiting, diarrhea, chest pain, or shortness of breath.\par * The patient has been counseled on avoiding NSAIDs.\par * Reducing or avoiding red meat, following a relatively low oxalate diet, and starting folate 800 mg qd has been advised.\par * A counseling information sheet has been given and they were provided sufficient time to review this. All their questions were answered.\par \par # Proteinuria.\par * Recheck urine protein.\par \par # CAD.\par * Follow up with Dr. Agosto and cardiology. \par \par # Borderline DM/obesity.\par * Start metformin 500 mg daily. Benefits, alternatives, and risks to medication including but not limited to acidosis, GI problems discussed and they consent. UpToDate patient information form on medication provided. All their questions were answered.

## 2019-06-07 LAB
APPEARANCE: CLEAR
BILIRUBIN URINE: NEGATIVE
BLOOD URINE: NEGATIVE
COLOR: YELLOW
CREAT SPEC-SCNC: 228 MG/DL
CREAT/PROT UR: 0.1 RATIO
GLUCOSE QUALITATIVE U: NEGATIVE
KETONES URINE: NEGATIVE
LEUKOCYTE ESTERASE URINE: NEGATIVE
NITRITE URINE: NEGATIVE
PH URINE: 5.5
PROT UR-MCNC: 29 MG/DL
PROTEIN URINE: NORMAL
SPECIFIC GRAVITY URINE: 1.02
UROBILINOGEN URINE: NORMAL

## 2019-07-17 ENCOUNTER — APPOINTMENT (OUTPATIENT)
Dept: HEART AND VASCULAR | Facility: CLINIC | Age: 64
End: 2019-07-17

## 2019-07-17 ENCOUNTER — APPOINTMENT (OUTPATIENT)
Dept: HEART AND VASCULAR | Facility: CLINIC | Age: 64
End: 2019-07-17
Payer: MEDICARE

## 2019-07-17 VITALS
OXYGEN SATURATION: 98 % | HEART RATE: 60 BPM | WEIGHT: 195 LBS | SYSTOLIC BLOOD PRESSURE: 112 MMHG | HEIGHT: 68 IN | DIASTOLIC BLOOD PRESSURE: 62 MMHG | BODY MASS INDEX: 29.55 KG/M2

## 2019-07-17 PROCEDURE — 99214 OFFICE O/P EST MOD 30 MIN: CPT

## 2019-07-17 PROCEDURE — 93880 EXTRACRANIAL BILAT STUDY: CPT

## 2019-07-22 NOTE — REASON FOR VISIT
[FreeTextEntry1] : Feeling well without focal weakness, numbness, speech or visual disturbances.  Walking 5-6 blocks without leg pain.

## 2019-07-22 NOTE — PHYSICAL EXAM
[General Appearance - Well Developed] : well developed [Normal Conjunctiva] : the conjunctiva exhibited no abnormalities [Normal Oral Mucosa] : normal oral mucosa [JVD Elevated _____cm] : JVD elevated [unfilled] ~U cm above clavicle [Respiration, Rhythm And Depth] : normal respiratory rhythm and effort [Auscultation Breath Sounds / Voice Sounds] : lungs were clear to auscultation bilaterally [Heart Sounds] : normal S1 and S2 [Murmurs] : no murmurs present [Bowel Sounds] : normal bowel sounds [Abnormal Walk] : normal gait [Nail Clubbing] : no clubbing of the fingernails [Skin Color & Pigmentation] : normal skin color and pigmentation [Normal Mental Status] : the patient was oriented to person, place and time [Appropriate] : appropriate [Normal] : the deep tendon reflexes were normal [FreeTextEntry1] : 2+ femoral pulses bilaterally with mild femoral bruits b/l

## 2019-07-22 NOTE — ASSESSMENT
[FreeTextEntry1] : 1. Carotid stenosis: s/p ALEX stent, no symptom or signs of TIA/CVA.\par 2. PAD: s/p right iliac stent with much improved claudication.\par 3. CAD: s/p RIVERA without angina.\par \par Plan:\par 1. C/w current meds.\par 2. RTC in 6 months.

## 2019-07-26 ENCOUNTER — APPOINTMENT (OUTPATIENT)
Dept: NEUROLOGY | Facility: CLINIC | Age: 64
End: 2019-07-26

## 2019-10-07 NOTE — H&P ADULT - RESPIRATORY
COLONOSCOPY: GOLYTELY PREP     Re: Haydee Lozano      Provider: Alivia Kulkarni MD     Your colon must be cleaned of stool in order to have a good view. You should follow these directions in order to have a successful colonoscopy.    IF YOU DO  NOT HAVE AN ADULT TO DRIVE YOU HOME, YOUR SURGERY WILL BE CANCELLED.     Your exam is scheduled as an outpatient procedure on:     Day: Thursday    Date: DECEMBER 5 2019    Arrival Time: 8:15 AM    Location: [facility], [address] [place] [id number] - Directions: Come all the way into the main lobby of the building and take the interior elevator down to the lower level. Turn left off the elevator and walk straight ahead to the Endoscopy reception area.     You will need the following in order to complete your prep:   1. Golytely/Nulytely  2. Two Simethicone tablets (OVER THE COUNTER)  3. Two Dulcolax (Bisacodyl) tablets (OVER THE COUNTER)    Your prep kit has been has been sent to    [id number] - [address] AT [facility] [address]  [address] [facility] [address]  Phone: [phone] Fax: [phone]    [facility] - [address] [address]  Phone: [phone] Fax: [phone]  .    Please  the kit today.    7 days before colonoscopy: Review your colonoscopy instructions. (Instructions can also be found on [employer]thart)  • Arrange a ride: you will be given medicine that makes you relax and sleepy, so you cannot drive a car or take a bus/taxi home. If you arrive without an 18 year or older escort, your procedure will need to be rescheduled. Your  MUST stay with you for the duration of the procedure.  • Stop eating popcorn, nuts, flax/sesame seeds, or any food that contains seeds      3 BUSINESS DAYS BEFORE your procedure:  • Confirm your ride. Your  MUST stay with you for the duration of the  procedure.  • If you need to cancel your appointment, call your doctor or nurse at [phone] to avoid a cancellation fee.  • Stop taking all anti-inflammatory medicines. These include: Advil, Aleve, Naprosyn, (Tylenol is okay to take)  • Review the diet you need to follow for the next 2 days. Plan your meals according to this diet.      1 DAY BEFORE the procedure:     •Start a strict, clear liquid diet from the moment you wake. A clear liquid diet can include: Apple juice, white grape juice, and white cranberry juice; Beef or chicken broths that are clear - no noodles, vegetables, rice, etc; Tea and coffee without milk; -Soda pop, Gatorade, Phoenix-Aid and various -Jell-O Flavors (any color except red or purple)  •Avoid: juices with pulp, milk, cream, solid food, alcohol, and hard candy      • In the morning when you wake, follow the directions on the prep packaging to mix your prep.  Fill the gallon jug half full with lukewarm tap water, shake vigorously. Continue to fill the jug with lukewarm water until full. Shake until the powder is completely dissolved. Chill in refrigerator before drinking. You may mix 1-2 tubs of Crystal Light powder to flavor the prep.     Drink half of the gallon preparation at 4:00 p.m, drinking 8 ounces of solution every 10 minutes. Also chew one Simethicone tablet with the first glass of solution. Place remaining solution in the refrigerator.   • The laxative liquid will cause you to have many bowel movements. This is needed so that you will have a clean colon for the procedure. Plan to stay home for the duration of the prep.  If you have problems completing this preparation, call your doctor or nurse at [phone].   Continue to drink clear liquids throughout the evening but do not eat any solid food.          ON THE DAY OF the procedure:    •2:15 AM: Drink 8 ounces of solution every 10 minutes until complete.  Take one Dulcolax (Bisacodyl) tablet with each of the last two glasses of  the solution. Chew the remaining Simethicone tablet. For a successful prep, you must drink the entire solution. Stop all clear liquids including water at 4:15 AM.    • Take your medications; ATENOLOL with a sip of water before 4:15 AM. You cannot have anything by mouth including water at this time.  Remember to review your diabetic medications instructions and follow accordingly.  If you are still having solid/formed stools please call the doctor's office at [phone].   • Bring your escort/ with you to your appointment. Remember the escort must be 18 years or older.    Bring photo ID, current insurance card, inhalers or nebulizers and an updated list of your current medications with you to your procedure. You will be at the [facility] or the hospital approximately 2-3 hours. Do not wear any jewelry, contact lenses, or bring any valuables. A pregnancy test is required if you are female and under the age of 56. Be aware that there is a chance that your procedure time may be changed.       detailed exam

## 2020-01-27 ENCOUNTER — APPOINTMENT (OUTPATIENT)
Dept: HEART AND VASCULAR | Facility: CLINIC | Age: 65
End: 2020-01-27
Payer: MEDICARE

## 2020-01-27 VITALS
HEIGHT: 68 IN | WEIGHT: 198 LBS | OXYGEN SATURATION: 97 % | BODY MASS INDEX: 30.01 KG/M2 | DIASTOLIC BLOOD PRESSURE: 76 MMHG | TEMPERATURE: 99.1 F | SYSTOLIC BLOOD PRESSURE: 124 MMHG | HEART RATE: 92 BPM

## 2020-01-27 PROCEDURE — 93880 EXTRACRANIAL BILAT STUDY: CPT

## 2020-01-27 PROCEDURE — 99213 OFFICE O/P EST LOW 20 MIN: CPT

## 2020-01-27 NOTE — REVIEW OF SYSTEMS
[Memory Lapses Or Loss] : memory lapses or loss [Fever] : no fever [Chills] : no chills [Blurry Vision] : no blurred vision [Earache] : no earache [Shortness Of Breath] : no shortness of breath [Chest Pain] : no chest pain [Cough] : no cough [Wheezing] : no wheezing [Abdominal Pain] : no abdominal pain [Nausea] : no nausea [Vomiting] : no vomiting [Urinary Frequency] : no change in urinary frequency [Impotence] : no impotence [Joint Pain] : no joint pain [Joint Swelling] : no joint swelling [Skin: A Rash] : no rash: [Skin Lesions] : no skin lesions [Dizziness] : no dizziness [Convulsions] : no convulsions [Confusion] : no confusion was observed [Excessive Thirst] : no polydipsia [Easy Bleeding] : no tendency for easy bleeding [Easy Bruising] : no tendency for easy bruising

## 2020-01-27 NOTE — ASSESSMENT
[FreeTextEntry1] : 1. Carotid stenosis: s/p ALEX stent, patent, asymptomatic.\par 2. PAD: s/p right iliac stent, without active claudication.\par 3. CAD: s/p CABG with low EF, but no active CHF.\par \par Plan:\par 1. C/w current meds.\par 2. RTC in one year.\par 3. Follow up with regular cardiology.

## 2020-01-27 NOTE — HISTORY OF PRESENT ILLNESS
[FreeTextEntry1] : Feeling well as far as neurological symptom is concerned with normal strength in all extremities without weakness or numbness.  No headache, speech or visual disturbances.  No more claudication.  Still complains of general malaise, lethargy and decreased will to move, without depressive mood.  Apparently does not need surgery for prostate CA.

## 2020-01-27 NOTE — PHYSICAL EXAM
[General Appearance - Well Developed] : well developed [Normal Conjunctiva] : the conjunctiva exhibited no abnormalities [Normal Oral Mucosa] : normal oral mucosa [JVD Elevated _____cm] : JVD elevated [unfilled] ~U cm above clavicle [Respiration, Rhythm And Depth] : normal respiratory rhythm and effort [Auscultation Breath Sounds / Voice Sounds] : lungs were clear to auscultation bilaterally [Heart Sounds] : normal S1 and S2 [Murmurs] : no murmurs present [Bowel Sounds] : normal bowel sounds [Abnormal Walk] : normal gait [Nail Clubbing] : no clubbing of the fingernails [Skin Color & Pigmentation] : normal skin color and pigmentation [Normal Mental Status] : the patient was oriented to person, place and time [Appropriate] : appropriate [FreeTextEntry1] : 2+ femoral pulses bilaterally with mild femoral bruits b/l

## 2020-05-07 ENCOUNTER — APPOINTMENT (OUTPATIENT)
Dept: HEART AND VASCULAR | Facility: CLINIC | Age: 65
End: 2020-05-07
Payer: MEDICARE

## 2020-05-07 PROCEDURE — 99214 OFFICE O/P EST MOD 30 MIN: CPT

## 2020-05-10 NOTE — HISTORY OF PRESENT ILLNESS
[FreeTextEntry1] : 65yo male last since in the office on Jan 2020 and is schedule for Telehealth for pre-op cardiac evaluation for a prostate biopsy with his urologist, Dr. Will. \par \par This visit was provided via telehealth using real-time 2-way audio/visual technology.  The patient, Saleem Agudelo, was located at home, in [place], [place], at the time of the visit.  The provider, Mikaela Agosto, was located in the office, [address] [place] [address], at the time of the visit.  The patient and the provider participated in the telehealth encounter on the date herein indicated.  Verbal consent for telehealth was obtained by the provider from the patient, self.\par \par He has been feeling well since the last visit.  Denies any chest pain, BALDWIN, palpitation, PND, orthopnea or LE edema. Admits to legs fatigue when he walks fast after 3 blocks although much improved compared with what he had prior to his right iliac stent in 2018. Stated the legs fatigue can be due to not exercising as much or walking since the quarantine and will exercise more since the weather is getting nicer.

## 2020-05-10 NOTE — PLAN
[FreeTextEntry1] : Assessment:\par 1. Pre-op cardiac evaluation: for low risk nonvascular surgery under local anesthesia in pt with CAD s/p CABG with low EF but no chest pain or CHF.  No previous cardiac stent.  Carotid and iliac stents were placed more than one year ago.  He is at average CV risk for proposed procedure.  .\par 2. PAD: s/p R iliac stent in 2018 and if leg pain persisted will need to obtain LE duplex to r/o restenosis.  \par \par Plan:\par 1. He may proceed with planned procedure and stop ASA and Plavix 3 days prior to procedure and restart a day after procedure\par 2. Pt was advice to call the office as ASAP if the leg symptom persists or worsens. Will schedule for LE duplex.then.\par 3. Continue Telehealth/office in 6 months

## 2020-06-04 ENCOUNTER — APPOINTMENT (OUTPATIENT)
Dept: HEART AND VASCULAR | Facility: CLINIC | Age: 65
End: 2020-06-04
Payer: MEDICARE

## 2020-06-04 ENCOUNTER — NON-APPOINTMENT (OUTPATIENT)
Age: 65
End: 2020-06-04

## 2020-06-04 VITALS
HEART RATE: 72 BPM | SYSTOLIC BLOOD PRESSURE: 124 MMHG | HEIGHT: 68 IN | OXYGEN SATURATION: 96 % | WEIGHT: 206 LBS | DIASTOLIC BLOOD PRESSURE: 80 MMHG | TEMPERATURE: 98.3 F | BODY MASS INDEX: 31.22 KG/M2

## 2020-06-04 DIAGNOSIS — I50.42 CHRONIC COMBINED SYSTOLIC (CONGESTIVE) AND DIASTOLIC (CONGESTIVE) HEART FAILURE: ICD-10-CM

## 2020-06-04 PROCEDURE — 93923 UPR/LXTR ART STDY 3+ LVLS: CPT

## 2020-06-04 PROCEDURE — 99214 OFFICE O/P EST MOD 30 MIN: CPT

## 2020-06-14 NOTE — HISTORY OF PRESENT ILLNESS
[FreeTextEntry1] : 63 yo male with PMHx of HTN, DM, HF (EF 30%), CAD s/p CABG (2011), carotid stenosis s/p ALEX stent (1/2020), PVD s/p right iliac stent (2018) presented on May 7 via telehealth c/o legs fatigue when he walks fast after 3 blocks although symptoms improved with what he had prior to his right iliac stent in 2018. Pt is now presented to the office c/o worsen leg pain b/l. Stated he is unable to walk more than 2 blocks and feels the pain from his calf to the thigh b/l. Pt needed to stop and rest.  Along with BALDWIN when walking up incline. Admits to having back pain but denies any back pain shooting down to the leg, lethargy, and stomach bloated. Denies any chest pain, resting pain, leg swelling, or gangrene/ulcer. Denies orthopnea.

## 2020-06-14 NOTE — PHYSICAL EXAM
[General Appearance - Well Developed] : well developed [Normal Conjunctiva] : the conjunctiva exhibited no abnormalities [General Appearance - Well Nourished] : well nourished [Normal Oral Mucosa] : normal oral mucosa [Normal Oropharynx] : normal oropharynx [JVD Elevated _____cm] : JVD elevated [unfilled] ~U cm above clavicle [] : no respiratory distress [Respiration, Rhythm And Depth] : normal respiratory rhythm and effort [Heart Rate And Rhythm] : heart rate and rhythm were normal [Heart Sounds] : normal S1 and S2 [Left Femoral Bruit] : left femoral bruit heard [5th Left ICS - MCL] : palpated at the 5th LICS in the midclavicular line [Normal Rate] : normal [Rhythm Regular] : regular [Normal S1] : normal S1 [Normal S2] : normal S2 [No Murmur] : no murmurs heard [Right Carotid Bruit] : right carotid bruit heard [2+] : right 2+ [1+] : left 1+ [No Pitting Edema] : no pitting edema present [Bowel Sounds] : normal bowel sounds [Abnormal Walk] : normal gait [Nail Clubbing] : no clubbing of the fingernails [Skin Color & Pigmentation] : normal skin color and pigmentation [Oriented To Time, Place, And Person] : oriented to person, place, and time [Left Carotid Bruit] : no bruit heard over the left carotid [Right Femoral Bruit] : no bruit heard over the right femoral artery [Rt] : no varicose veins of the right leg [Lt] : no varicose veins of the left leg [FreeTextEntry1] : well healed surgical scar on RLE

## 2020-06-14 NOTE — ASSESSMENT
[FreeTextEntry1] : 1. PAD: s/p right iliac stent 2018 now presented with worsening b/l leg pain and R femoral bruit heard during exam, will need to get Abdomen and LE duplex to r/o restenosis. \par 2. SOB: pt have a h/o HF and last echo in 2018 EF 35% will need to repeat ECHO to elevate EF. \par 3. Life style modification: pt gain 8lbs from last visit in Jan 2020. Advice pt diet control and exercise. \par \par Plan:\par 1. Arterial LE Duplex scheduled for 6/15\par 2. Abdominal iliac US scheduled for 6/15\par 3. Echo scheduled for 6/15\par 4. Telehealth with results on 6/18

## 2020-06-15 ENCOUNTER — APPOINTMENT (OUTPATIENT)
Dept: HEART AND VASCULAR | Facility: CLINIC | Age: 65
End: 2020-06-15
Payer: MEDICARE

## 2020-06-15 PROCEDURE — 93923 UPR/LXTR ART STDY 3+ LVLS: CPT

## 2020-06-15 PROCEDURE — 93306 TTE W/DOPPLER COMPLETE: CPT

## 2020-06-15 PROCEDURE — 93925 LOWER EXTREMITY STUDY: CPT

## 2020-06-15 PROCEDURE — 93978 VASCULAR STUDY: CPT

## 2020-06-18 ENCOUNTER — APPOINTMENT (OUTPATIENT)
Dept: HEART AND VASCULAR | Facility: CLINIC | Age: 65
End: 2020-06-18
Payer: MEDICARE

## 2020-06-18 PROCEDURE — 99442: CPT | Mod: 95

## 2020-06-18 NOTE — HISTORY OF PRESENT ILLNESS
[Home] : at home, [unfilled] , at the time of the visit. [Other Location: e.g. Home (Enter Location, City,State)___] : at [unfilled] [Verbal consent obtained from patient] : the patient, [unfilled] [FreeTextEntry1] : Recent tests result reviewed with patient.  The LVEF has been maintained at 35-40%.  The REIA stent is patent with elevated velocity in bilateral SARAH, although bilateral VINH's are normal.  The patient still complains about BALDWIN, fatigue and bilateral calf pain upon walking 3 blocks.  Admits to not excising much and gaining 10-15 lbs over the past 3 months, although his weight over the past 2 weeks has been going down for about 5 lbs.

## 2020-06-18 NOTE — PLAN
[FreeTextEntry1] : 1. BALDWIN/fatigue: with maintained LVEF and no physical signs of fluid overload during last in person visit with stable weight, doubt worsening HF, likely due to deconditioning.  \par 2. B/L calf pain: with patient stent in REIA.  There is elevated velocity in SAARH's although no definitive stenosis and normal resting VINH.  Would encourage exercise and re-evaluate in 3 months.  If persistent or worsening claudication, will proceed with CTA to rule out distal aortic and iliac bifurcation stenosis.\par 3. Carotid stenosis: s/p stent, Remains asymptomatic.\par \par Plan:\par 1. C/w current meds.\par 2. Encourage aerobic exercise.\par 3. RTC in 3 months.

## 2020-07-12 NOTE — PATIENT PROFILE ADULT - NSSTREETDRUGUSEDT_GEN_A_NUR
As certified below, I, or a nurse practitioner or physician assistant working with me, had a face-to-face encounter that meets the physician face-to-face encounter requirements.
12-Dec-2018

## 2020-10-26 RX ORDER — CLOPIDOGREL 75 MG/1
75 TABLET, FILM COATED ORAL
Refills: 0 | Status: DISCONTINUED | COMMUNITY
End: 2020-10-26

## 2020-10-29 ENCOUNTER — LABORATORY RESULT (OUTPATIENT)
Age: 65
End: 2020-10-29

## 2020-10-29 ENCOUNTER — APPOINTMENT (OUTPATIENT)
Dept: HEART AND VASCULAR | Facility: CLINIC | Age: 65
End: 2020-10-29
Payer: MEDICARE

## 2020-10-29 VITALS
HEIGHT: 68 IN | SYSTOLIC BLOOD PRESSURE: 124 MMHG | OXYGEN SATURATION: 96 % | HEART RATE: 81 BPM | TEMPERATURE: 97.4 F | DIASTOLIC BLOOD PRESSURE: 88 MMHG | WEIGHT: 212 LBS | BODY MASS INDEX: 32.13 KG/M2

## 2020-10-29 DIAGNOSIS — M79.604 PAIN IN RIGHT LEG: ICD-10-CM

## 2020-10-29 DIAGNOSIS — M79.605 PAIN IN RIGHT LEG: ICD-10-CM

## 2020-10-29 PROCEDURE — 99214 OFFICE O/P EST MOD 30 MIN: CPT

## 2020-10-29 RX ORDER — LISINOPRIL 5 MG/1
5 TABLET ORAL DAILY
Qty: 90 | Refills: 3 | Status: DISCONTINUED | COMMUNITY
Start: 2019-03-18 | End: 2020-10-29

## 2020-10-29 NOTE — ASSESSMENT
[FreeTextEntry1] : 66 yo male with PMHx of HTN, DM, HF (EF 35-40% on 6/2020), CAD s/p CABG (2011), carotid stenosis s/p ALEX stent (1/2020), PVD s/p right iliac stent (2018) presented to the office today c/o b/l leg pain. \par \par 1. PAD: s/p right iliac stent 2018 now presented with b/l leg fatigue after walking 2 blocks. Arterial duplex (6/15) which showed no evidence of hemodynamically significant stenosis but elevated velocity in bilateral common iliac arteries. Will need to get CTA abdominal aortia with run off to r/o distal aorta and ostial iliac disease as cause of pt's symptoms.\par 2. Ischemic DCM: s/p CABG EF 35-40% on 6/2020 and now c/o BALDWIN and exertional nausea after 2 blocks, will obtain BNP to r/o worsening heart failure and exercise stress test to rule out ischemia. Was recently dc'd Lisinopril due to a cough, will start pt on Cozaar and titrate up as tolerated. \par 3. Carotid stenosis: s/p ALEX stent (1/2020), last carotid US on 1/2020 showed right patent stented with left proximal ICA 20-49% stenosis. On exam there is right carotid bruit heard and will need a carotid us to evaluate stent and stenosis. \par 4. HTN: controlled\par 5. Recent travel: pt recently travel to [place] and would want to test for COVID. \par \par Plan: \par 1. Labs work in office today: CBC, CMP, Lipid profile, CPK, BNP, thyroid panel \par 2. CTA abdominal Aorta with run off\par 3. Carotid US\par 4. Exercise nuclear stress test \par 5. COVID swab in office today \par 6. RTC in 3 weeks.

## 2020-10-29 NOTE — PHYSICAL EXAM
[General Appearance - Well Developed] : well developed [General Appearance - Well Nourished] : well nourished [Normal Conjunctiva] : the conjunctiva exhibited no abnormalities [Eyelids - No Xanthelasma] : the eyelids demonstrated no xanthelasmas [Normal Oral Mucosa] : normal oral mucosa [Normal Oropharynx] : normal oropharynx [JVD Elevated _____cm] : JVD elevated [unfilled] ~U cm above clavicle [Respiration, Rhythm And Depth] : normal respiratory rhythm and effort [Auscultation Breath Sounds / Voice Sounds] : lungs were clear to auscultation bilaterally [Normal Rate] : normal [Rhythm Regular] : regular [Normal S1] : normal S1 [Normal S2] : normal S2 [2+] : right 2+ [1+] : left 1+ [Abdomen Soft] : soft [Abdomen Tenderness] : non-tender [Abnormal Walk] : normal gait [Nail Clubbing] : no clubbing of the fingernails [Cyanosis, Localized] : no localized cyanosis [Skin Color & Pigmentation] : normal skin color and pigmentation [Skin Turgor] : normal skin turgor [Oriented To Time, Place, And Person] : oriented to person, place, and time [Right Carotid Bruit] : right carotid bruit heard [Left Carotid Bruit] : no bruit heard over the left carotid

## 2020-10-29 NOTE — HISTORY OF PRESENT ILLNESS
[FreeTextEntry1] : 64 yo male with PMHx of HTN, DM, HF (EF 35-40% on 6/2020), CAD s/p CABG (2011), carotid stenosis s/p ALEX stent (1/2020), PVD s/p right iliac stent (2018) presented to the office today c/o b/l leg pain x 5 months. Recent travel to [place]. Stated after walking 2 blocks, his legs would feel fatigue from the back of thigh radiating down. Pt needed to stop and rest. Admitted to BALDWIN which required him to stop as well. Also admit being nauseous after walking which is his angina equivalent prior to CABG. Denies any resting pain, leg swelling, or gangrene/ulcer. Denies orthopnea. No focal weakness, numbness, speech or visual disturbances.

## 2020-11-06 ENCOUNTER — RESULT REVIEW (OUTPATIENT)
Age: 65
End: 2020-11-06

## 2020-11-06 ENCOUNTER — APPOINTMENT (OUTPATIENT)
Dept: CT IMAGING | Facility: HOSPITAL | Age: 65
End: 2020-11-06
Payer: MEDICARE

## 2020-11-06 ENCOUNTER — OUTPATIENT (OUTPATIENT)
Dept: OUTPATIENT SERVICES | Facility: HOSPITAL | Age: 65
LOS: 1 days | End: 2020-11-06
Payer: MEDICARE

## 2020-11-06 DIAGNOSIS — Z86.79 PERSONAL HISTORY OF OTHER DISEASES OF THE CIRCULATORY SYSTEM: Chronic | ICD-10-CM

## 2020-11-06 DIAGNOSIS — Z95.1 PRESENCE OF AORTOCORONARY BYPASS GRAFT: Chronic | ICD-10-CM

## 2020-11-06 PROCEDURE — 75635 CT ANGIO ABDOMINAL ARTERIES: CPT | Mod: 26

## 2020-11-06 PROCEDURE — 75635 CT ANGIO ABDOMINAL ARTERIES: CPT

## 2020-11-11 ENCOUNTER — APPOINTMENT (OUTPATIENT)
Dept: HEART AND VASCULAR | Facility: CLINIC | Age: 65
End: 2020-11-11
Payer: MEDICARE

## 2020-11-11 ENCOUNTER — LABORATORY RESULT (OUTPATIENT)
Age: 65
End: 2020-11-11

## 2020-11-11 VITALS — WEIGHT: 212 LBS | HEIGHT: 68 IN | BODY MASS INDEX: 32.13 KG/M2

## 2020-11-11 DIAGNOSIS — I49.9 CARDIAC ARRHYTHMIA, UNSPECIFIED: ICD-10-CM

## 2020-11-11 PROCEDURE — 78452 HT MUSCLE IMAGE SPECT MULT: CPT

## 2020-11-11 PROCEDURE — 93015 CV STRESS TEST SUPVJ I&R: CPT

## 2020-11-11 PROCEDURE — 93880 EXTRACRANIAL BILAT STUDY: CPT

## 2020-11-11 PROCEDURE — A9500: CPT

## 2020-11-11 PROCEDURE — ZZZZZ: CPT

## 2020-11-12 LAB
ANION GAP SERPL CALC-SCNC: 18 MMOL/L
BUN SERPL-MCNC: 20 MG/DL
CALCIUM SERPL-MCNC: 9.6 MG/DL
CHLORIDE SERPL-SCNC: 102 MMOL/L
CO2 SERPL-SCNC: 25 MMOL/L
CREAT SERPL-MCNC: 1.46 MG/DL
GLUCOSE SERPL-MCNC: 101 MG/DL
POTASSIUM SERPL-SCNC: 3.9 MMOL/L
SODIUM SERPL-SCNC: 144 MMOL/L

## 2020-11-16 ENCOUNTER — APPOINTMENT (OUTPATIENT)
Dept: HEART AND VASCULAR | Facility: CLINIC | Age: 65
End: 2020-11-16
Payer: MEDICARE

## 2020-11-16 VITALS
SYSTOLIC BLOOD PRESSURE: 124 MMHG | BODY MASS INDEX: 28.79 KG/M2 | DIASTOLIC BLOOD PRESSURE: 84 MMHG | WEIGHT: 189.99 LBS | HEIGHT: 68 IN | TEMPERATURE: 99.1 F | OXYGEN SATURATION: 96 % | HEART RATE: 92 BPM

## 2020-11-16 DIAGNOSIS — Z95.828 PRESENCE OF OTHER VASCULAR IMPLANTS AND GRAFTS: ICD-10-CM

## 2020-11-16 DIAGNOSIS — R06.00 DYSPNEA, UNSPECIFIED: ICD-10-CM

## 2020-11-16 DIAGNOSIS — Z23 ENCOUNTER FOR IMMUNIZATION: ICD-10-CM

## 2020-11-16 PROCEDURE — 90686 IIV4 VACC NO PRSV 0.5 ML IM: CPT

## 2020-11-16 PROCEDURE — 99214 OFFICE O/P EST MOD 30 MIN: CPT | Mod: 25

## 2020-11-16 PROCEDURE — G0008: CPT

## 2020-11-16 RX ORDER — LOSARTAN POTASSIUM 25 MG/1
25 TABLET, FILM COATED ORAL DAILY
Qty: 30 | Refills: 2 | Status: DISCONTINUED | COMMUNITY
Start: 2020-10-29 | End: 2020-11-16

## 2020-11-16 NOTE — PHYSICAL EXAM
[General Appearance - Well Developed] : well developed [Normal Appearance] : normal appearance [Well Groomed] : well groomed [General Appearance - Well Nourished] : well nourished [No Deformities] : no deformities [General Appearance - In No Acute Distress] : no acute distress [Normal Conjunctiva] : the conjunctiva exhibited no abnormalities [Eyelids - No Xanthelasma] : the eyelids demonstrated no xanthelasmas [Normal Oral Mucosa] : normal oral mucosa [No Oral Pallor] : no oral pallor [No Oral Cyanosis] : no oral cyanosis [Normal Jugular Venous A Waves Present] : normal jugular venous A waves present [Normal Jugular Venous V Waves Present] : normal jugular venous V waves present [No Jugular Venous Colby A Waves] : no jugular venous colby A waves [Normal] : normal [Normal Rate] : normal [Rhythm Regular] : regular [Normal S1] : normal S1 [Normal S2] : normal S2 [No Pitting Edema] : no pitting edema present [Respiration, Rhythm And Depth] : normal respiratory rhythm and effort [Exaggerated Use Of Accessory Muscles For Inspiration] : no accessory muscle use [Auscultation Breath Sounds / Voice Sounds] : lungs were clear to auscultation bilaterally [Abdomen Soft] : soft [Abdomen Tenderness] : non-tender [Abdomen Mass (___ Cm)] : no abdominal mass palpated [Abnormal Walk] : normal gait [Gait - Sufficient For Exercise Testing] : the gait was sufficient for exercise testing [Nail Clubbing] : no clubbing of the fingernails [Cyanosis, Localized] : no localized cyanosis [Petechial Hemorrhages (___cm)] : no petechial hemorrhages [Skin Color & Pigmentation] : normal skin color and pigmentation [] : no rash [No Venous Stasis] : no venous stasis [Skin Lesions] : no skin lesions [No Skin Ulcers] : no skin ulcer [No Xanthoma] : no  xanthoma was observed [Oriented To Time, Place, And Person] : oriented to person, place, and time [Affect] : the affect was normal [Mood] : the mood was normal [No Anxiety] : not feeling anxious

## 2020-11-16 NOTE — ASSESSMENT
[FreeTextEntry1] : 66 yo male with PMHx of HTN, DM, HF (EF 27% on 11/11/20), CAD s/p CABG (2011), carotid stenosis s/p ALEX stent (1/2020), \par recent symptoms of BALDWIN, weight gain, worsening CHF.\par Nuclear scan did not show any active ischemia.\par he did respond to lasix.\par \par will optimize his CHF regiment:\par increase metoprolol from 100 to 100\par d/c losartan and start entresto 24-26 bid with plans to uptitrate to full dose.\par torsemide 10 mg qd and re-evaluate to prn.\par \par 2 gm Na diet\par lower egg consumption (now eats 3 daily). change to 5 per week.\par Daily walks\par \par once on GDMT, ECHO to re-evaluate EF. If <35%, discussion about prophylactic ICD. \par QRS <100\par RTC 1 month for BMP and up-titration.\par flu vaccine toda\par \par

## 2020-11-16 NOTE — HISTORY OF PRESENT ILLNESS
[FreeTextEntry1] : Referred by Dr. Agosto\par \par 66 yo male with PMHx of HTN, DM, HF (EF 35-40% on 6/2020), CAD s/p CABG (2011), carotid stenosis s/p ALEX stent (1/2020), PVD s/p right iliac stent (2018) presented to Dr. Agosto c/o b/l leg pain x 5 months. Recent travel to [place]. Stated after walking 2 blocks, his legs would feel fatigue from the back of thigh radiating down. Pt needed to stop and rest. \par Recent worsening of  BALDWIN which required him to stop as well. Also admit being nauseous after walking which is his angina equivalent prior to CABG. Denies any resting pain, leg swelling, or gangrene/ulcer. Denies orthopnea. No focal weakness, numbness, speech or visual disturbances. \par recent orthopnea and BALDWIN that improved with Lasix\par \par \par some recent weight gain (5 pounds) that resolved with the lasix\par intentional weight loss of 18 pounds over past year.\par  recently dc'd Lisinopril due to a cough\par Nuclear stress 11/11/20\par Ejection Fraction: Rest 27 %, post stress 27 % \par Perfusion Images: reveal a large sized, severe fixed inferior defect from apex to base and with extension into the apical inferolateral wall. There is minimal apical reperfusion. \par Resting Wall Motion: The LV is dilated with global hypokinesia and inferior akinesia. \par Ischemic Dilatation: no significant \par \par works as an actor

## 2020-12-21 ENCOUNTER — APPOINTMENT (OUTPATIENT)
Dept: HEART AND VASCULAR | Facility: CLINIC | Age: 65
End: 2020-12-21
Payer: MEDICARE

## 2020-12-21 ENCOUNTER — NON-APPOINTMENT (OUTPATIENT)
Age: 65
End: 2020-12-21

## 2020-12-21 VITALS
OXYGEN SATURATION: 96 % | TEMPERATURE: 98 F | BODY MASS INDEX: 28.29 KG/M2 | HEIGHT: 69 IN | WEIGHT: 191 LBS | DIASTOLIC BLOOD PRESSURE: 68 MMHG | SYSTOLIC BLOOD PRESSURE: 118 MMHG | HEART RATE: 65 BPM

## 2020-12-21 PROCEDURE — 93000 ELECTROCARDIOGRAM COMPLETE: CPT

## 2020-12-21 PROCEDURE — 99215 OFFICE O/P EST HI 40 MIN: CPT

## 2020-12-21 NOTE — HISTORY OF PRESENT ILLNESS
[FreeTextEntry1] : Referred by Dr. Agosto. first seen 11/16/20\par \par 66 yo male with PMHx of HTN, DM, HF (EF 35-40% on 6/2020), CAD s/p CABG (2011), carotid stenosis s/p ALEX stent (1/2020), PVD s/p right iliac stent (2018) presented to Dr. Agosto c/o b/l leg pain x 5 months. Recent travel to [place]. Stated after walking 2 blocks, his legs would feel fatigue from the back of thigh radiating down. Pt needed to stop and rest. \par Recent worsening of  BALDWIN which required him to stop as well. Also admit being nauseous after walking which is his angina equivalent prior to CABG. Denies any resting pain, leg swelling, or gangrene/ulcer. Denies orthopnea. No focal weakness, numbness, speech or visual disturbances. \par recent orthopnea and BALDWIN that improved with Lasix\par \par \par some recent weight gain (5 pounds) that resolved with the lasix\par intentional weight loss of 18 pounds over past year.\par  recently dc'd Lisinopril due to a cough\par Nuclear stress 11/11/20\par Ejection Fraction: Rest 27 %, post stress 27 % \par Perfusion Images: reveal a large sized, severe fixed inferior defect from apex to base and with extension into the apical inferolateral wall. There is minimal apical reperfusion. \par Resting Wall Motion: The LV is dilated with global hypokinesia and inferior akinesia. \par Ischemic Dilatation: no significant \par \par works as an actor\par at previous visit:\par increased metoprolol from 100 to 200\par d/c losartan and start entresto 24-26 bid with plans to uptitrate to full dose.\par torsemide 10 mg qd and re-evaluate to prn.\par \par reports FEELING BETTER. TOLERATING MEDS.\par less BALDWIN, more active\par

## 2020-12-21 NOTE — ASSESSMENT
[FreeTextEntry1] : 66 yo male with PMHx of HTN, DM, HF (EF 27% on 11/11/20), CAD s/p CABG (2011), carotid stenosis s/p ALEX stent (1/2020), \par recent symptoms of BALDWIN, weight gain, worsening CHF.\par Nuclear scan did not show any active ischemia.\par \par feeling much better. less BALDWIN. More stamina\par \par TODAY, increased entresto to 49-51 bid. \par lowered torsemide to 10 tiw\par continue toprol.\par RTC 1 month and plan to uptitrate entresto to full dose\par \par once on GDMT, ECHO to re-evaluate EF. If <35%, discussion about prophylactic ICD. \par QRS <100\par \par

## 2020-12-21 NOTE — PHYSICAL EXAM
[General Appearance - Well Developed] : well developed [Normal Appearance] : normal appearance [Well Groomed] : well groomed [General Appearance - Well Nourished] : well nourished [No Deformities] : no deformities [General Appearance - In No Acute Distress] : no acute distress [Normal Conjunctiva] : the conjunctiva exhibited no abnormalities [Eyelids - No Xanthelasma] : the eyelids demonstrated no xanthelasmas [Normal Oral Mucosa] : normal oral mucosa [No Oral Pallor] : no oral pallor [No Oral Cyanosis] : no oral cyanosis [Normal Jugular Venous A Waves Present] : normal jugular venous A waves present [Normal Jugular Venous V Waves Present] : normal jugular venous V waves present [No Jugular Venous Colby A Waves] : no jugular venous colby A waves [Respiration, Rhythm And Depth] : normal respiratory rhythm and effort [Exaggerated Use Of Accessory Muscles For Inspiration] : no accessory muscle use [Auscultation Breath Sounds / Voice Sounds] : lungs were clear to auscultation bilaterally [Abdomen Soft] : soft [Abdomen Tenderness] : non-tender [Abdomen Mass (___ Cm)] : no abdominal mass palpated [Abnormal Walk] : normal gait [Gait - Sufficient For Exercise Testing] : the gait was sufficient for exercise testing [Nail Clubbing] : no clubbing of the fingernails [Cyanosis, Localized] : no localized cyanosis [Petechial Hemorrhages (___cm)] : no petechial hemorrhages [Skin Color & Pigmentation] : normal skin color and pigmentation [] : no rash [No Venous Stasis] : no venous stasis [Skin Lesions] : no skin lesions [No Skin Ulcers] : no skin ulcer [No Xanthoma] : no  xanthoma was observed [Oriented To Time, Place, And Person] : oriented to person, place, and time [Affect] : the affect was normal [Mood] : the mood was normal [No Anxiety] : not feeling anxious [Normal] : normal [Normal Rate] : normal [Rhythm Regular] : regular [Normal S1] : normal S1 [Normal S2] : normal S2 [No Pitting Edema] : no pitting edema present

## 2021-01-20 ENCOUNTER — APPOINTMENT (OUTPATIENT)
Dept: HEART AND VASCULAR | Facility: CLINIC | Age: 66
End: 2021-01-20
Payer: MEDICARE

## 2021-01-20 VITALS
TEMPERATURE: 99 F | DIASTOLIC BLOOD PRESSURE: 65 MMHG | WEIGHT: 192.99 LBS | BODY MASS INDEX: 28.58 KG/M2 | HEIGHT: 69 IN | HEART RATE: 67 BPM | SYSTOLIC BLOOD PRESSURE: 111 MMHG | OXYGEN SATURATION: 98 %

## 2021-01-20 PROCEDURE — 36415 COLL VENOUS BLD VENIPUNCTURE: CPT

## 2021-01-20 PROCEDURE — 99215 OFFICE O/P EST HI 40 MIN: CPT

## 2021-01-20 RX ORDER — AMLODIPINE BESYLATE 10 MG/1
10 TABLET ORAL DAILY
Qty: 90 | Refills: 0 | Status: DISCONTINUED | COMMUNITY
Start: 2019-02-25 | End: 2021-01-20

## 2021-01-20 NOTE — HISTORY OF PRESENT ILLNESS
[FreeTextEntry1] : Referred by Dr. Agosto. first seen 11/16/20, last seen 12/21/20\par \par 64 yo male with PMHx of HTN, DM, HF (EF 35-40% on 6/2020), CAD s/p CABG (2011),\par \par  Denies orthopnea. No focal weakness, numbness, speech or visual disturbances. \par recent orthopnea and BALDWIN that improved with Lasix\par \par  dc'd Lisinopril due to a cough\par Nuclear stress 11/11/20\par Ejection Fraction: Rest 27 %, post stress 27 % \par Perfusion Images: reveal a large sized, severe fixed inferior defect from apex to base and with extension into the apical inferolateral wall. There is minimal apical reperfusion. \par Resting Wall Motion: The LV is dilated with global hypokinesia and inferior akinesia. \par Ischemic Dilatation: no significant \par \par works as an actor\par at previous visit:\par increased entresto to 49-51 bid. \par lowered torsemide to 10 tiw\par continue toprol.\par \par reports FEELING MUCH BETTER. TOLERATING MEDS.\par less BALDWIN, more active\par

## 2021-01-20 NOTE — ASSESSMENT
[FreeTextEntry1] : 64 yo male with PMHx of HTN, DM, HF (EF 27% on 11/11/20), CAD s/p CABG (2011), carotid stenosis s/p ALEX stent (1/2020), \par recent symptoms of BALDWIN, weight gain, worsening CHF.\par Nuclear scan did not show any active ischemia.\par \par feeling much better. less BALDWIN. More stamina\par \par  uptitrate entresto to full dose of  bid\par continue toprol 200\par d/c amlodipine with increasing dose of entresto\par \par once on GDMT, ECHO to re-evaluate EF. If <35%, discussion about prophylactic ICD. \par QRS <100\par \par 
fever and fussiness

## 2021-01-21 LAB
ANION GAP SERPL CALC-SCNC: 17 MMOL/L
BUN SERPL-MCNC: 27 MG/DL
CALCIUM SERPL-MCNC: 9.4 MG/DL
CHLORIDE SERPL-SCNC: 103 MMOL/L
CO2 SERPL-SCNC: 20 MMOL/L
CREAT SERPL-MCNC: 1.54 MG/DL
GLUCOSE SERPL-MCNC: 117 MG/DL
POTASSIUM SERPL-SCNC: 4.4 MMOL/L
SODIUM SERPL-SCNC: 141 MMOL/L

## 2021-01-28 ENCOUNTER — APPOINTMENT (OUTPATIENT)
Dept: HEART AND VASCULAR | Facility: CLINIC | Age: 66
End: 2021-01-28
Payer: MEDICARE

## 2021-01-28 VITALS
SYSTOLIC BLOOD PRESSURE: 100 MMHG | OXYGEN SATURATION: 97 % | DIASTOLIC BLOOD PRESSURE: 68 MMHG | HEART RATE: 67 BPM | WEIGHT: 192 LBS | TEMPERATURE: 97 F | HEIGHT: 69 IN | BODY MASS INDEX: 28.44 KG/M2

## 2021-01-28 VITALS — SYSTOLIC BLOOD PRESSURE: 106 MMHG | DIASTOLIC BLOOD PRESSURE: 64 MMHG

## 2021-01-28 PROCEDURE — 99214 OFFICE O/P EST MOD 30 MIN: CPT

## 2021-01-28 NOTE — ASSESSMENT
[FreeTextEntry1] : 1. PAD: s/p iliac stent with no claudication.\par 2. Carotid stenosis: s/p ALEX stent without symptoms.\par 3. Ischemic DCM: recently started on Entresto, just titrated to maximal strength, with marked symptomatic improvement.  Case discussed with Dr. Cisneros who suggests 6 weeks of therapy before re-evaluating LVEF and then decide the need for ICD.\par \par Plan:\par 1. C/w current meds.\par 2. Follow up with Dr. Tang in 6 weeks for repeat ECHO to re-evaluate LVEF before decision for ICD.

## 2021-01-28 NOTE — HISTORY OF PRESENT ILLNESS
[FreeTextEntry1] : Feeling much better after Entresto, with increased energy.  Able to walk 3-4 blocks now before feeling fatigue as opposed to one one block before.  No exertional chest pain, BALDWIN, leg pain.  No PND, orthopnea or LE edema.  No focal weakness or numbness, speech or visual disturbances.  No palpitation or syncope.

## 2021-01-28 NOTE — PHYSICAL EXAM
[General Appearance - Well Developed] : well developed [Normal Conjunctiva] : the conjunctiva exhibited no abnormalities [Normal Oral Mucosa] : normal oral mucosa [JVD Elevated _____cm] : JVD elevated [unfilled] ~U cm above clavicle [Respiration, Rhythm And Depth] : normal respiratory rhythm and effort [Auscultation Breath Sounds / Voice Sounds] : lungs were clear to auscultation bilaterally [Heart Sounds] : normal S1 and S2 [Murmurs] : no murmurs present [Bowel Sounds] : normal bowel sounds [Abnormal Walk] : normal gait [Nail Clubbing] : no clubbing of the fingernails [FreeTextEntry1] : 2+ femoral pulses bilaterally with mild femoral bruits b/l [Skin Color & Pigmentation] : normal skin color and pigmentation

## 2021-02-26 ENCOUNTER — APPOINTMENT (OUTPATIENT)
Dept: HEART AND VASCULAR | Facility: CLINIC | Age: 66
End: 2021-02-26
Payer: MEDICARE

## 2021-02-26 ENCOUNTER — NON-APPOINTMENT (OUTPATIENT)
Age: 66
End: 2021-02-26

## 2021-02-26 VITALS
HEART RATE: 67 BPM | DIASTOLIC BLOOD PRESSURE: 70 MMHG | WEIGHT: 200 LBS | SYSTOLIC BLOOD PRESSURE: 122 MMHG | BODY MASS INDEX: 30.31 KG/M2 | OXYGEN SATURATION: 97 % | TEMPERATURE: 97.3 F | HEIGHT: 68 IN

## 2021-02-26 DIAGNOSIS — I10 ESSENTIAL (PRIMARY) HYPERTENSION: ICD-10-CM

## 2021-02-26 PROCEDURE — 99214 OFFICE O/P EST MOD 30 MIN: CPT

## 2021-02-26 PROCEDURE — 93000 ELECTROCARDIOGRAM COMPLETE: CPT

## 2021-02-26 NOTE — PHYSICAL EXAM
[General Appearance - Well Developed] : well developed [Normal Conjunctiva] : the conjunctiva exhibited no abnormalities [Normal Oral Mucosa] : normal oral mucosa [JVD Elevated _____cm] : JVD elevated [unfilled] ~U cm above clavicle [Heart Sounds] : normal S1 and S2 [Murmurs] : no murmurs present [Respiration, Rhythm And Depth] : normal respiratory rhythm and effort [Auscultation Breath Sounds / Voice Sounds] : lungs were clear to auscultation bilaterally [Bowel Sounds] : normal bowel sounds [Abnormal Walk] : normal gait [Nail Clubbing] : no clubbing of the fingernails [Skin Color & Pigmentation] : normal skin color and pigmentation [FreeTextEntry1] : 2+ femoral pulses bilaterally with mild femoral bruits b/l

## 2021-02-26 NOTE — HISTORY OF PRESENT ILLNESS
[FreeTextEntry1] : 65 M PAD, systolic chf\par \par 2/26/21: feeling much better on entresto.  notes he forgot taking his norvasc and his BP was 180/100 last week, PCP put him on clonidine with improvement. restarted norvasc.  takes herbal supplement garcinia for weight loss

## 2021-02-26 NOTE — ASSESSMENT
[FreeTextEntry1] : 1. PAD: s/p iliac stent with no claudication.\par 2. Carotid stenosis: s/p ALEX stent without symptoms.\par 3. Ischemic DCM: euvolemic\par \par Plan:\par - restart amlodipine, hold clonidine for now.  advised to get bp cuff for home monitoring. Target < 130/80.  \par - stable PAD\par - continue aspirin, lipitor 80mg\par - Dr. Gotti for CHF care.  Entresto, BB. Follow up echo, deciding if candidate for AICD

## 2021-03-15 ENCOUNTER — APPOINTMENT (OUTPATIENT)
Dept: HEART AND VASCULAR | Facility: CLINIC | Age: 66
End: 2021-03-15
Payer: MEDICARE

## 2021-03-15 VITALS
HEART RATE: 66 BPM | TEMPERATURE: 97.3 F | OXYGEN SATURATION: 96 % | DIASTOLIC BLOOD PRESSURE: 80 MMHG | HEIGHT: 68 IN | BODY MASS INDEX: 30.01 KG/M2 | WEIGHT: 198 LBS | SYSTOLIC BLOOD PRESSURE: 126 MMHG

## 2021-03-15 DIAGNOSIS — E78.5 HYPERLIPIDEMIA, UNSPECIFIED: ICD-10-CM

## 2021-03-15 PROCEDURE — 93306 TTE W/DOPPLER COMPLETE: CPT

## 2021-03-15 PROCEDURE — 99214 OFFICE O/P EST MOD 30 MIN: CPT

## 2021-03-15 RX ORDER — METOPROLOL SUCCINATE 200 MG/1
200 TABLET, EXTENDED RELEASE ORAL DAILY
Qty: 90 | Refills: 3 | Status: ACTIVE | COMMUNITY
Start: 1900-01-01 | End: 1900-01-01

## 2021-03-15 RX ORDER — ATORVASTATIN CALCIUM 80 MG/1
80 TABLET, FILM COATED ORAL
Qty: 90 | Refills: 3 | Status: ACTIVE | COMMUNITY
Start: 1900-01-01 | End: 1900-01-01

## 2021-03-15 RX ORDER — EZETIMIBE 10 MG/1
10 TABLET ORAL
Qty: 90 | Refills: 3 | Status: ACTIVE | COMMUNITY
Start: 2019-04-13 | End: 1900-01-01

## 2021-03-15 RX ORDER — AMLODIPINE BESYLATE 10 MG/1
10 TABLET ORAL DAILY
Qty: 90 | Refills: 3 | Status: ACTIVE | COMMUNITY

## 2021-03-15 RX ORDER — DAPAGLIFLOZIN 10 MG/1
10 TABLET, FILM COATED ORAL
Qty: 90 | Refills: 3 | Status: ACTIVE | COMMUNITY
Start: 2021-03-15 | End: 1900-01-01

## 2021-03-15 NOTE — HISTORY OF PRESENT ILLNESS
[FreeTextEntry1] : Referred by Dr. Agosto. first seen 11/16/20, last seen 1/20/21\par \par 66 yo male with PMHx of HTN, DM, HF (EF 35-40% on 6/2020), CAD s/p CABG (2011),\par \par  Denies orthopnea. No focal weakness, numbness, speech or visual disturbances. \par recent orthopnea and BALDWIN that improved with Lasix\par At last visit:\par  uptitrate entresto to full dose of  bid\par continue toprol 200\par \par ECHO EF today: 35-40%\par \par Lisinopril AE: cough\par Nuclear stress 11/11/20\par Ejection Fraction: Rest 27 %, post stress 27 % \par Perfusion Images: reveal a large sized, severe fixed inferior defect from apex to base and with extension into the apical inferolateral wall. There is minimal apical reperfusion. \par Resting Wall Motion: The LV is dilated with global hypokinesia and inferior akinesia. \par Ischemic Dilatation: no significant \par \par works as an actor\par \par reports FEELING MUCH BETTER. TOLERATING MEDS.\par less BALDWIN, more active\par

## 2021-03-15 NOTE — ASSESSMENT
[FreeTextEntry1] : 64 yo male with PMHx of HTN, DM, HF (EF 27% on 11/11/20), CAD s/p CABG (2011), carotid stenosis s/p ALEX stent (1/2020), \par \par \par feeling much better. less BALDWIN. More stamina\par without  amlodipine, his BP dramatically increased.\par \par EF today 35-40%. On GDMT. Tus, no indiction for prophylactic ICD.\par continue current doses of amlodipine, entresto, toprol, Torsemide prn\par will add Farxiga 10 mg qd.\par Home BP cuff.\par Recommended he take COVID vaccine.\par meds, current status and prognosis reviewed at length.\par \par \par

## 2021-05-18 ENCOUNTER — RX RENEWAL (OUTPATIENT)
Age: 66
End: 2021-05-18

## 2021-05-18 RX ORDER — TORSEMIDE 10 MG/1
10 TABLET ORAL
Qty: 15 | Refills: 5 | Status: ACTIVE | COMMUNITY
Start: 2020-11-16 | End: 1900-01-01

## 2021-06-09 ENCOUNTER — APPOINTMENT (OUTPATIENT)
Dept: HEART AND VASCULAR | Facility: CLINIC | Age: 66
End: 2021-06-09
Payer: MEDICARE

## 2021-06-09 VITALS
BODY MASS INDEX: 30.46 KG/M2 | WEIGHT: 201 LBS | HEIGHT: 68 IN | SYSTOLIC BLOOD PRESSURE: 113 MMHG | OXYGEN SATURATION: 96 % | HEART RATE: 67 BPM | TEMPERATURE: 98.6 F | DIASTOLIC BLOOD PRESSURE: 64 MMHG

## 2021-06-09 DIAGNOSIS — Z95.1 PRESENCE OF AORTOCORONARY BYPASS GRAFT: ICD-10-CM

## 2021-06-09 DIAGNOSIS — R73.03 PREDIABETES.: ICD-10-CM

## 2021-06-09 DIAGNOSIS — I73.9 PERIPHERAL VASCULAR DISEASE, UNSPECIFIED: ICD-10-CM

## 2021-06-09 PROCEDURE — 99213 OFFICE O/P EST LOW 20 MIN: CPT

## 2021-06-09 NOTE — HISTORY OF PRESENT ILLNESS
[FreeTextEntry1] : Referred by Dr. Agosto. first seen 11/16/20, last seen 1/20/21\par \par 64 yo male with PMHx of HTN, DM, HF (EF 35-40% on 6/2020), CAD s/p CABG (2011),\par \par  Denies orthopnea. No focal weakness, numbness, speech or visual disturbances. \par recent orthopnea and BALDWIN that improved with Lasix\par \par feels well. has lost some weight\par fully vaccinated.\par \par \par Lisinopril AE: cough\par Nuclear stress 11/11/20\par Ejection Fraction: Rest 27 %, post stress 27 % \par Perfusion Images: reveal a large sized, severe fixed inferior defect from apex to base and with extension into the apical inferolateral wall. There is minimal apical reperfusion. \par Resting Wall Motion: The LV is dilated with global hypokinesia and inferior akinesia. \par Ischemic Dilatation: no significant \par EF 35-40%. On GDMT. 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

## 2021-06-09 NOTE — REASON FOR VISIT
[Follow-Up - Clinic] : a clinic follow-up of [Heart Failure] : congestive heart failure [Cardiac Failure] : cardiac failure

## 2021-06-09 NOTE — ASSESSMENT
[FreeTextEntry1] : 64 yo male with PMHx of HTN, DM, HF (EF 27% on 11/11/20), CAD s/p CABG (2011), carotid stenosis s/p ALEX stent (1/2020), \par \par \par feeling much better. less BALDWIN. More stamina\par \par \par explore financila assistance for entresto and farxiga\par .\par meds, current status and prognosis reviewed at length.\par \par \par f/u Kitty--carotid eval\par here in 3 months

## 2021-09-15 ENCOUNTER — APPOINTMENT (OUTPATIENT)
Dept: HEART AND VASCULAR | Facility: CLINIC | Age: 66
End: 2021-09-15

## 2021-09-28 ENCOUNTER — NON-APPOINTMENT (OUTPATIENT)
Age: 66
End: 2021-09-28

## 2021-09-28 ENCOUNTER — APPOINTMENT (OUTPATIENT)
Dept: HEART AND VASCULAR | Facility: CLINIC | Age: 66
End: 2021-09-28
Payer: MEDICARE

## 2021-09-28 VITALS
TEMPERATURE: 98.8 F | DIASTOLIC BLOOD PRESSURE: 68 MMHG | WEIGHT: 198.99 LBS | HEART RATE: 76 BPM | OXYGEN SATURATION: 97 % | BODY MASS INDEX: 30.16 KG/M2 | SYSTOLIC BLOOD PRESSURE: 121 MMHG | HEIGHT: 68 IN

## 2021-09-28 DIAGNOSIS — I65.29 OCCLUSION AND STENOSIS OF UNSPECIFIED CAROTID ARTERY: ICD-10-CM

## 2021-09-28 DIAGNOSIS — I25.10 ATHEROSCLEROTIC HEART DISEASE OF NATIVE CORONARY ARTERY W/OUT ANGINA PECTORIS: ICD-10-CM

## 2021-09-28 DIAGNOSIS — I73.9 PERIPHERAL VASCULAR DISEASE, UNSPECIFIED: ICD-10-CM

## 2021-09-28 PROCEDURE — 93000 ELECTROCARDIOGRAM COMPLETE: CPT

## 2021-09-28 PROCEDURE — 99214 OFFICE O/P EST MOD 30 MIN: CPT

## 2021-09-28 NOTE — ASSESSMENT
[FreeTextEntry1] : 66 M\par \par 1. PAD: s/p iliac stent.  Claudication after ~ 3 blocks, equal in both legs \par 2. Carotid stenosis: s/p ALEX stent without symptoms.\par 3. Ischemic DCM: \par 4. CAD s/p CABG 2010 - exertional nausea\par \par Meds: lipitor 80mg, Toprol XL 200mg, zetia 10mg, torsemide 10mg, entresto 97/103mg \par \par Plan:\par - exertional nausea, angina equivalent for patient.  Will repeat pharm nuclear stress, repeat echo \par - check abis and LE art duplex \par - check carotid duplex \par - previously followed by Dr. Gotti for CHF care, deemed not candidate for ICD as improved EF 40-45%.  \par - farxiga is too expensive, will try to get med discounted.  recommended to optimize his systolic heart failure

## 2021-09-28 NOTE — HISTORY OF PRESENT ILLNESS
[FreeTextEntry1] : 66 M PAD, systolic chf\par \par 2/26/21: feeling much better on entresto.  notes he forgot taking his norvasc and his BP was 180/100 last week, PCP put him on clonidine with improvement. restarted norvasc.  takes herbal supplement garcinia for weight loss\par \par 9/28/21: notes over the last 4-6 weeks feeling occasional nausea with exertion. similar symptoms but less severe to his pre cabg angina.  no cp or sob.  walked 1.5 miles today no cp or sob but feels burning in both calves.  the claudication has been going on a few years and may be getting a little worse.  et ~ 3 blocks.

## 2021-09-28 NOTE — PHYSICAL EXAM
[General Appearance - Well Developed] : well developed [Normal Conjunctiva] : the conjunctiva exhibited no abnormalities [Normal Oral Mucosa] : normal oral mucosa [JVD Elevated _____cm] : JVD elevated [unfilled] ~U cm above clavicle [Respiration, Rhythm And Depth] : normal respiratory rhythm and effort [Auscultation Breath Sounds / Voice Sounds] : lungs were clear to auscultation bilaterally [Heart Sounds] : normal S1 and S2 [Murmurs] : no murmurs present [Bowel Sounds] : normal bowel sounds [Abnormal Walk] : normal gait [Nail Clubbing] : no clubbing of the fingernails [Skin Color & Pigmentation] : normal skin color and pigmentation [FreeTextEntry1] : 2+ femoral pulses bilaterally with mild femoral bruits b/l

## 2021-09-29 LAB
25(OH)D3 SERPL-MCNC: 24.7 NG/ML
ALBUMIN SERPL ELPH-MCNC: 4.7 G/DL
ALBUMIN SERPL ELPH-MCNC: 4.9 G/DL
ALP BLD-CCNC: 84 U/L
ALP BLD-CCNC: 98 U/L
ALT SERPL-CCNC: 30 U/L
ALT SERPL-CCNC: 30 U/L
ANION GAP SERPL CALC-SCNC: 16 MMOL/L
ANION GAP SERPL CALC-SCNC: 17 MMOL/L
APTT BLD: 33.2 SEC
AST SERPL-CCNC: 29 U/L
AST SERPL-CCNC: 36 U/L
BASOPHILS # BLD AUTO: 0.06 K/UL
BASOPHILS # BLD AUTO: 0.07 K/UL
BASOPHILS NFR BLD AUTO: 0.8 %
BASOPHILS NFR BLD AUTO: 0.9 %
BILIRUB SERPL-MCNC: 0.4 MG/DL
BILIRUB SERPL-MCNC: 0.7 MG/DL
BUN SERPL-MCNC: 23 MG/DL
BUN SERPL-MCNC: 32 MG/DL
CALCIUM SERPL-MCNC: 10 MG/DL
CALCIUM SERPL-MCNC: 9.8 MG/DL
CHLORIDE SERPL-SCNC: 100 MMOL/L
CHLORIDE SERPL-SCNC: 103 MMOL/L
CHOLEST SERPL-MCNC: 87 MG/DL
CHOLEST SERPL-MCNC: 99 MG/DL
CK BB SERPL ELPH-CCNC: 0 % (ref 0–?)
CK MB CFR SERPL ELPH: 0 %
CK MM SERPL ELPH-CCNC: 100 %
CO2 SERPL-SCNC: 23 MMOL/L
CO2 SERPL-SCNC: 23 MMOL/L
CREAT SERPL-MCNC: 1.39 MG/DL
CREAT SERPL-MCNC: 1.43 MG/DL
CREATINE KINASE,TOTAL,SERUM: 119 U/L
EOSINOPHIL # BLD AUTO: 0.08 K/UL
EOSINOPHIL # BLD AUTO: 0.19 K/UL
EOSINOPHIL NFR BLD AUTO: 1.1 %
EOSINOPHIL NFR BLD AUTO: 2.5 %
ESTIMATED AVERAGE GLUCOSE: 131 MG/DL
GLUCOSE SERPL-MCNC: 112 MG/DL
GLUCOSE SERPL-MCNC: 94 MG/DL
HBA1C MFR BLD HPLC: 6.2 %
HCT VFR BLD CALC: 44.1 %
HCT VFR BLD CALC: 51.7 %
HDLC SERPL-MCNC: 34 MG/DL
HDLC SERPL-MCNC: 41 MG/DL
HGB BLD-MCNC: 14.1 G/DL
HGB BLD-MCNC: 16.3 G/DL
IMM GRANULOCYTES NFR BLD AUTO: 0.4 %
IMM GRANULOCYTES NFR BLD AUTO: 0.6 %
INR PPP: 1 RATIO
LDLC SERPL CALC-MCNC: 33 MG/DL
LDLC SERPL CALC-MCNC: 43 MG/DL
LYMPHOCYTES # BLD AUTO: 1.29 K/UL
LYMPHOCYTES # BLD AUTO: 1.51 K/UL
LYMPHOCYTES NFR BLD AUTO: 17.3 %
LYMPHOCYTES NFR BLD AUTO: 21 %
MACRO TYPE 1: 0 %
MACRO TYPE 2: 0 %
MAGNESIUM SERPL-MCNC: 2 MG/DL
MAN DIFF?: NORMAL
MAN DIFF?: NORMAL
MCHC RBC-ENTMCNC: 27.1 PG
MCHC RBC-ENTMCNC: 28.7 PG
MCHC RBC-ENTMCNC: 31.5 GM/DL
MCHC RBC-ENTMCNC: 32 GM/DL
MCV RBC AUTO: 84.8 FL
MCV RBC AUTO: 91.2 FL
MONOCYTES # BLD AUTO: 0.85 K/UL
MONOCYTES # BLD AUTO: 0.87 K/UL
MONOCYTES NFR BLD AUTO: 11.7 %
MONOCYTES NFR BLD AUTO: 11.8 %
NEUTROPHILS # BLD AUTO: 4.65 K/UL
NEUTROPHILS # BLD AUTO: 5.01 K/UL
NEUTROPHILS NFR BLD AUTO: 64.7 %
NEUTROPHILS NFR BLD AUTO: 67.2 %
NONHDLC SERPL-MCNC: 53 MG/DL
NONHDLC SERPL-MCNC: 58 MG/DL
NT-PROBNP SERPL-MCNC: 1052 PG/ML
NT-PROBNP SERPL-MCNC: 1090 PG/ML
PLATELET # BLD AUTO: 197 K/UL
PLATELET # BLD AUTO: 204 K/UL
POTASSIUM SERPL-SCNC: 4 MMOL/L
POTASSIUM SERPL-SCNC: 4.2 MMOL/L
PROT SERPL-MCNC: 7.3 G/DL
PROT SERPL-MCNC: 7.6 G/DL
PSA FREE FLD-MCNC: 8 %
PSA FREE SERPL-MCNC: 2.03 NG/ML
PSA SERPL-MCNC: 25.4 NG/ML
PT BLD: 11.8 SEC
RBC # BLD: 5.2 M/UL
RBC # BLD: 5.67 M/UL
RBC # FLD: 14.3 %
RBC # FLD: 14.4 %
SARS-COV-2 N GENE NPH QL NAA+PROBE: NOT DETECTED
SODIUM SERPL-SCNC: 140 MMOL/L
SODIUM SERPL-SCNC: 142 MMOL/L
T3FREE SERPL-MCNC: 3.47 PG/ML
T3RU NFR SERPL: 0.9 TBI
T4 FREE SERPL-MCNC: 1.4 NG/DL
T4 SERPL-MCNC: 6.1 UG/DL
TRIGL SERPL-MCNC: 75 MG/DL
TRIGL SERPL-MCNC: 99 MG/DL
TSH SERPL-ACNC: 1.12 UIU/ML
TSH SERPL-ACNC: 2.45 UIU/ML
WBC # FLD AUTO: 7.19 K/UL
WBC # FLD AUTO: 7.46 K/UL

## 2021-10-04 ENCOUNTER — APPOINTMENT (OUTPATIENT)
Dept: HEART AND VASCULAR | Facility: CLINIC | Age: 66
End: 2021-10-04
Payer: MEDICARE

## 2021-10-04 PROCEDURE — 93925 LOWER EXTREMITY STUDY: CPT

## 2021-10-04 PROCEDURE — 93924 LWR XTR VASC STDY BILAT: CPT

## 2021-10-04 PROCEDURE — 93880 EXTRACRANIAL BILAT STUDY: CPT

## 2021-10-04 PROCEDURE — 93306 TTE W/DOPPLER COMPLETE: CPT

## 2021-10-06 PROBLEM — I10 ESSENTIAL HYPERTENSION: Status: ACTIVE | Noted: 2018-12-11

## 2021-10-11 ENCOUNTER — RESULT REVIEW (OUTPATIENT)
Age: 66
End: 2021-10-11

## 2021-10-11 ENCOUNTER — OUTPATIENT (OUTPATIENT)
Dept: OUTPATIENT SERVICES | Facility: HOSPITAL | Age: 66
LOS: 1 days | End: 2021-10-11
Payer: MEDICARE

## 2021-10-11 DIAGNOSIS — Z86.79 PERSONAL HISTORY OF OTHER DISEASES OF THE CIRCULATORY SYSTEM: Chronic | ICD-10-CM

## 2021-10-11 DIAGNOSIS — Z95.1 PRESENCE OF AORTOCORONARY BYPASS GRAFT: Chronic | ICD-10-CM

## 2021-10-11 DIAGNOSIS — I25.810 ATHEROSCLEROSIS OF CORONARY ARTERY BYPASS GRAFT(S) WITHOUT ANGINA PECTORIS: ICD-10-CM

## 2021-10-11 PROCEDURE — 93018 CV STRESS TEST I&R ONLY: CPT

## 2021-10-11 PROCEDURE — 78452 HT MUSCLE IMAGE SPECT MULT: CPT | Mod: 26,MH

## 2021-10-11 PROCEDURE — 78452 HT MUSCLE IMAGE SPECT MULT: CPT

## 2021-10-11 PROCEDURE — A9500: CPT

## 2021-10-11 PROCEDURE — 93017 CV STRESS TEST TRACING ONLY: CPT

## 2021-10-11 PROCEDURE — A9505: CPT

## 2021-10-11 PROCEDURE — 93016 CV STRESS TEST SUPVJ ONLY: CPT

## 2021-12-14 RX ORDER — SACUBITRIL AND VALSARTAN 97; 103 MG/1; MG/1
97-103 TABLET, FILM COATED ORAL TWICE DAILY
Qty: 180 | Refills: 3 | Status: ACTIVE | OUTPATIENT
Start: 2020-11-16

## 2021-12-22 ENCOUNTER — APPOINTMENT (OUTPATIENT)
Dept: HEART AND VASCULAR | Facility: CLINIC | Age: 66
End: 2021-12-22
Payer: MEDICARE

## 2021-12-22 VITALS
HEART RATE: 74 BPM | DIASTOLIC BLOOD PRESSURE: 76 MMHG | WEIGHT: 195.99 LBS | BODY MASS INDEX: 29.7 KG/M2 | TEMPERATURE: 97.2 F | HEIGHT: 68 IN | SYSTOLIC BLOOD PRESSURE: 115 MMHG | OXYGEN SATURATION: 98 %

## 2021-12-22 DIAGNOSIS — I50.22 CHRONIC SYSTOLIC (CONGESTIVE) HEART FAILURE: ICD-10-CM

## 2021-12-22 DIAGNOSIS — N18.30 CHRONIC KIDNEY DISEASE, STAGE 3 UNSPECIFIED: ICD-10-CM

## 2021-12-22 DIAGNOSIS — I50.9 HEART FAILURE, UNSPECIFIED: ICD-10-CM

## 2021-12-22 DIAGNOSIS — N40.0 BENIGN PROSTATIC HYPERPLASIA WITHOUT LOWER URINARY TRACT SYMPMS: ICD-10-CM

## 2021-12-22 DIAGNOSIS — I25.810 ATHEROSCLEROSIS OF CORONARY ARTERY BYPASS GRAFT(S) W/OUT ANGINA PECTORIS: ICD-10-CM

## 2021-12-22 PROCEDURE — 99214 OFFICE O/P EST MOD 30 MIN: CPT

## 2021-12-22 PROCEDURE — 93000 ELECTROCARDIOGRAM COMPLETE: CPT

## 2021-12-22 RX ORDER — CLOPIDOGREL BISULFATE 75 MG/1
75 TABLET, FILM COATED ORAL
Qty: 90 | Refills: 3 | Status: ACTIVE | COMMUNITY
Start: 2021-12-22 | End: 1900-01-01

## 2021-12-22 NOTE — HISTORY OF PRESENT ILLNESS
[FreeTextEntry1] : 66 M PAD, systolic chf\par \par 2/26/21: feeling much better on entresto.  notes he forgot taking his norvasc and his BP was 180/100 last week, PCP put him on clonidine with improvement. restarted norvasc.  takes herbal supplement garcinia for weight loss\par \par 9/28/21: notes over the last 4-6 weeks feeling occasional nausea with exertion. similar symptoms but less severe to his pre cabg angina.  no cp or sob.  walked 1.5 miles today no cp or sob but feels burning in both calves.  the claudication has been going on a few years and may be getting a little worse.  et ~ 3 blocks.  \par \par 12/22/21:  walking up to 4 miles in a day, though feels claudication both calves after ~ 3 blocks and SOB,  No cp with exertion. gets nausea at rest, most commonly in AM, not a/w exertion.    no leg swelling or orthopnea.

## 2021-12-22 NOTE — ASSESSMENT
[FreeTextEntry1] : 66 M\par \par 1. PAD: s/p iliac stent.  Claudication after ~ 3 blocks, equal in both legs VINH L 0.9 R 0.96. No significant stenoses on LE art dupelx, however monophasic waveforms noted in CFA bilaterally.\par 2. Carotid stenosis: s/p ALEX stent without symptoms. Patent stent on duplex Oct 2021\par 3. Ischemic DCM: No signs of failure  EF 25%.  EKG NSR, TWI avl, < 1mm STD V5-V6\par Pharm nuclear stress - large area of prior infarct in apex, apical septum, inferior walls.  mildly reduced ef with inferior wall hypokinesis.\par 4. CAD s/p CABG 2010 - nausea/BALDWIN\par \par Meds: lipitor 80mg, Toprol XL 200mg, zetia 10mg, torsemide 10mg, entresto 97/103mg \par \par Plan:\par - Given BALDWIN/nausea, LVEF severely reduced, down from previous echo in March 2021, + nuc stress large area of infarct with dorothy infarct ischemia recommend coronary angiogram.  Discussed benefits/risk of coronary angiogram, agreeable to procedure with possible intervention.\par - cont current meds, add plavix 75mg \par - labs\par - Would ultimately benefit from peripheral angio to address severe left eia disease and evaluate patency of right EIA stent.  Will address post coronary evaluation.\par - recommend heart failure consultation\par \par

## 2021-12-23 LAB
ALBUMIN SERPL ELPH-MCNC: 4.7 G/DL
ALP BLD-CCNC: 73 U/L
ALT SERPL-CCNC: 34 U/L
ANION GAP SERPL CALC-SCNC: 16 MMOL/L
APTT BLD: 36.3 SEC
AST SERPL-CCNC: 31 U/L
BASOPHILS # BLD AUTO: 0.06 K/UL
BASOPHILS NFR BLD AUTO: 0.8 %
BILIRUB SERPL-MCNC: 0.6 MG/DL
BUN SERPL-MCNC: 26 MG/DL
CALCIUM SERPL-MCNC: 9.5 MG/DL
CHLORIDE SERPL-SCNC: 103 MMOL/L
CO2 SERPL-SCNC: 23 MMOL/L
CREAT SERPL-MCNC: 1.46 MG/DL
EOSINOPHIL # BLD AUTO: 0.14 K/UL
EOSINOPHIL NFR BLD AUTO: 1.9 %
GLUCOSE SERPL-MCNC: 112 MG/DL
HCT VFR BLD CALC: 53.2 %
HGB BLD-MCNC: 16.9 G/DL
IMM GRANULOCYTES NFR BLD AUTO: 0.5 %
INR PPP: 0.99 RATIO
LYMPHOCYTES # BLD AUTO: 1.42 K/UL
LYMPHOCYTES NFR BLD AUTO: 19.1 %
MAGNESIUM SERPL-MCNC: 1.9 MG/DL
MAN DIFF?: NORMAL
MCHC RBC-ENTMCNC: 29.5 PG
MCHC RBC-ENTMCNC: 31.8 GM/DL
MCV RBC AUTO: 92.8 FL
MONOCYTES # BLD AUTO: 0.86 K/UL
MONOCYTES NFR BLD AUTO: 11.6 %
NEUTROPHILS # BLD AUTO: 4.91 K/UL
NEUTROPHILS NFR BLD AUTO: 66.1 %
NT-PROBNP SERPL-MCNC: 1689 PG/ML
PLATELET # BLD AUTO: 195 K/UL
POTASSIUM SERPL-SCNC: 4.3 MMOL/L
PROT SERPL-MCNC: 7.5 G/DL
PT BLD: 11.8 SEC
RBC # BLD: 5.73 M/UL
RBC # FLD: 14.1 %
SODIUM SERPL-SCNC: 142 MMOL/L
WBC # FLD AUTO: 7.43 K/UL

## 2021-12-30 NOTE — ED ADULT TRIAGE NOTE - AS O2 DELIVERY
ACUTE CARE SURGERY CONSULT     HPI: 60y Male present to ED from Rehab for PEG tube removal. 60 male who was admitted in late october after fall, sustaining a Lefort II fracture requiring Trach and PEG, patient recently started PO intake and does not use PEG anymore, sent from rehab due to difficulty removing PEG. denies abdominal pain, nausea vomiting, tolerating pO intake, passing gas      ROS: 10-system review is otherwise negative except HPI above.      PAST MEDICAL & SURGICAL HISTORY:  No pertinent past medical history      FAMILY HISTORY:    Family history not pertinent as reviewed with the patient.    SOCIAL HISTORY:  Denies any toxic habits    ALLERGIES: NKA No Known Allergies      HOME MEDICATIONS: ***  Home Medications:  albuterol 90 mcg/inh inhalation aerosol: 2 puff(s) inhaled every 6 hours, As needed, Wheezing (01 Nov 2021 10:55)  atorvastatin 10 mg oral tablet: 1 tab(s) orally once a day (18 Oct 2021 09:55)  chlorhexidine 0.12% mucous membrane liquid: 15 milliliter(s) mucous membrane  (01 Nov 2021 10:55)  Dilantin 100 mg oral capsule: 2 cap(s) orally once a day (18 Oct 2021 09:55)  enalapril 5 mg oral tablet: 1 tab(s) orally once a day (01 Nov 2021 10:55)  enoxaparin: 40 milligram(s) subcutaneous once a day (01 Nov 2021 10:58)  ergocalciferol 1.25 mg (50,000 intl units) oral capsule: 1 cap(s) orally once a week (18 Oct 2021 09:55)  insulin lispro 100 units/mL injectable solution: 1 Unit(s) if Glucose 151 - 200  2 Unit(s) if Glucose 201 - 250  3 Unit(s) if Glucose 251 - 300  4 Unit(s) if Glucose 301 - 350  5 Unit(s) if Glucose 351 - 400  6 Unit(s) if Glucose Greater Than 400 (01 Nov 2021 10:58)  melatonin 5 mg oral tablet: 1 tab(s) orally once a day (at bedtime) (01 Nov 2021 10:55)  ocular lubricant ophthalmic solution: 1 drop(s) to each affected eye once a day (01 Nov 2021 10:55)  oxyCODONE 5 mg/5 mL oral solution: 5 milliliter(s) orally every 4 hours, As needed, Moderate Pain (4 - 6) (01 Nov 2021 10:55)  oxyCODONE 5 mg/5 mL oral solution: 10 milliliter(s) orally every 4 hours, As needed, Severe Pain (7 - 10) (01 Nov 2021 10:55)  pantoprazole 40 mg oral delayed release tablet: 1 tab(s) orally once a day (18 Oct 2021 09:55)  QUEtiapine 50 mg oral tablet: 1 tab(s) orally once a day (at bedtime) (01 Nov 2021 10:55)  valproic acid 250 mg/5 mL oral liquid:  orally at 0800 daily (01 Nov 2021 10:58)  valproic acid 250 mg/5 mL oral liquid:  orally at 2000 daily (01 Nov 2021 10:58)      --------------------------------------------------------------------------------------------    PHYSICAL EXAM:   General: NAD, Lying in bed comfortably  Neuro: A+Ox3  HEENT: EOMI, PERRLA, MMM, trach site haled  Cardio: RRR  Resp: Non labored breathing on RA  GI/Abd: Soft, NT/ND, no rebound/guarding, no masses palpated, PEG with bumber loose at 6 cm, removed, slight oozing stopped with pressure, abdomen soft postop  Vascular: All 4 extremities warm and well perfused.   Pelvis: stable  Musculoskeletal: All 4 extremities moving spontaneously, no limitations, no spinal tenderness.  --------------------------------------------------------------------------------------------    LABS                   CAPILLARY BLOOD GLUCOSE              --------------------------------------------------------------------------------------------  IMAGINGX ray with brenda in abdomen    ASSESSMENT: Patient is a 60y old male presented for PEG removal, removed at bedside uneventful, dressed with xeroform and gauze, abdomen soft, can have po pain meds (liquid), if develops worsening abdominal pain, nausea vomiting, bleeding present back to ED.    PLAN:    - Removed PEG at bedside  - dressing as needed for one or 2 days xeroform,  given to patient   - alarm signs given to patient
room air

## 2022-01-04 VITALS
RESPIRATION RATE: 16 BRPM | OXYGEN SATURATION: 95 % | SYSTOLIC BLOOD PRESSURE: 137 MMHG | HEART RATE: 57 BPM | DIASTOLIC BLOOD PRESSURE: 72 MMHG | WEIGHT: 199.96 LBS | TEMPERATURE: 98 F | HEIGHT: 70 IN

## 2022-01-04 RX ORDER — CHLORHEXIDINE GLUCONATE 213 G/1000ML
1 SOLUTION TOPICAL ONCE
Refills: 0 | Status: DISCONTINUED | OUTPATIENT
Start: 2022-01-28 | End: 2022-02-11

## 2022-01-04 NOTE — H&P ADULT - NSICDXPASTMEDICALHX_GEN_ALL_CORE_FT
PAST MEDICAL HISTORY:  CAD (coronary artery disease)     Carotid stenosis, right s/p stent 1/2019    Diabetes     Essential hypertension     Hyperlipidemia     Osteoarthritis of b/l feet    PVD (peripheral vascular disease)

## 2022-01-04 NOTE — H&P ADULT - HISTORY OF PRESENT ILLNESS
SKELETON    Covid:  Cardiologist: Dr. Tang  Escort:  Pharmacy:    67 yo M, former heavy EtoH user and recreational Marijuana user (last use _______ months ago),  PMHx HTN, HLD, DM, HFrEF( EF 35% 12/2018), CAD s/p 4V CABG 2011 (LIMA-LAD, free MIGUEL-Diag 1, SVG-OM1, SVG-RCA) @ [facility] with most recent Cardiac Cath @ [facility] 11/30/2018 revealing 3VCAD (mLAD , D1 , OM1 70-80%, mRCA , patent grafts X 4), known PAD s/p PTCA/BMS R external iliac artery @ [facility] on 12/12/18, carotid stenosis s/p placement of ALEX stent 1/28/2019 with Dr. Agosto, TIA 2/2019,), CKD III (baseline Cr ___) who presented to Cardiologist Dr. Tang c/o BALDWIN upon ambulation of __ city blocks, resolving with rest, over past ___.     Denies CP,, dizziness, diaphoresis, palpitations, fatigue, LE edema, orthopnea, PND, syncope, N/V, abdominal pain, f/c, sick contact, recent travel.    In light of pt's risk factors, CCS Anginal Class ___ Sx, abnormal __, pt referred for cardiac cath with possible intervention.        SKELETON    Covid:  Cardiologist: Dr. Tang  Escort:  Pharmacy:    65 yo M, former heavy EtoH user and recreational Marijuana user (last use _______ months ago),  PMHx HTN, HLD, DM, HFrEF( EF 35% 12/2018), CAD s/p 4V CABG 2011 (LIMA-LAD, free MIGUEL-Diag 1, SVG-OM1, SVG-RCA) @ [facility] with most recent Cardiac Cath @ [facility] 11/30/2018 revealing 3VCAD (mLAD , D1 , OM1 70-80%, mRCA , patent grafts X 4), known PAD s/p PTCA/BMS R external iliac artery @ [facility] on 12/12/18, carotid stenosis s/p placement of ALEX stent 1/28/2019 with Dr. Agosto, TIA 2/2019,), CKD III (baseline Cr 1.3) who presented to Cardiologist Dr. Tang c/o BALDWIN upon ambulation of __ city blocks, resolving with rest, over past ___.     Denies CP,, dizziness, diaphoresis, palpitations, fatigue, LE edema, orthopnea, PND, syncope, N/V, abdominal pain, f/c, sick contact, recent travel.    In light of pt's risk factors, CCS Anginal Class ___ Sx, abnormal __, pt referred for cardiac cath with possible intervention.        Covid test done on 1/4 [facility]  Cardiologist: Dr. 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 with most recent Cardiac Cath @ [facility] 11/30/2018 revealing 3VCAD (mLAD , D1 , OM1 70-80%, mRCA , patent grafts X 4), known PAD s/p PTCA/BMS R external iliac artery @ [facility] on 12/12/18, carotid stenosis s/p placement of ALEX stent 1/28/2019 with Dr. Agosto, TIA 2/2019), prostate cancer (dx in 2017 ago, monitoring), CKD III (baseline Cr 1.3) who presented to Cardiologist Dr. Tang c/o BALDWIN upon ambulation of 2 city blocks or 1 after climbing 1 flight of stairs, resolving with rest, over past >6 months but this has  improved from <1/2 city block which was going on for years. Feeling of generalized fatigue several years. Patient endorses feeling of generalized nausea that is worse in the morning or when exerting himself over the past >6 months which he states is similar to how he felt prior to his CABG. Denies CP, dizziness, diaphoresis, palpitations, LE edema, orthopnea, syncope, N/V, abdominal pain, f/c, sick contact, recent travel.    In light of pt's risk factors, CCS Anginal Class III Equivalent Sx, known h/o CAD/PAD, pt referred for cardiac cath with possible intervention.        Covid test + 1/4/22 no repeat swab needed   Cardiologist: Dr. 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 Agosto, TIA 2/2019), prostate cancer (dx in 2017 ago, monitoring), CKD III (baseline Cr 1.3) who presented to Cardiologist Dr. Tang c/o BALDWIN upon ambulation of 2 city blocks or 1 after climbing 1 flight of stairs, resolving with rest, over past >6 months but this has  improved from <1/2 city block which was going on for years. Feeling of generalized fatigue several years. Patient endorses feeling of generalized nausea that is worse in the morning or when exerting himself over the past >6 months which he states is similar to how he felt prior to his CABG. Denies CP, dizziness, diaphoresis, palpitations, LE edema, orthopnea, syncope, N/V, abdominal pain, f/c, sick contact, recent travel.    In light of pt's risk factors, CCS Anginal Class III Equivalent Sx, known h/o CAD/PAD, pt referred for cardiac cath with possible intervention.        Covid test + 1/4/22 no repeat swab needed   Cardiologist: Dr. 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 11/30/2018 revealing 3VCAD (mLAD , D1 , OM1 70-80%, mRCA , patent grafts X 4), known PAD s/p PTCA/BMS R external iliac artery @ [facility] on 12/12/18, carotid stenosis s/p placement of ALEX stent 1/28/2019 with Dr. Agosto, TIA 2/2019), prostate cancer (dx in 2017 ago, monitoring), CKD III (baseline Cr 1.3) who presented to Cardiologist Dr. Tang c/o BALDWIN upon ambulation of 2 city blocks or 1 after climbing 1 flight of stairs, resolving with rest, over past >6 months but this has  improved from <1/2 city block which was going on for years. Feeling of generalized fatigue several years. Patient endorses feeling of generalized nausea that is worse in the morning or when exerting himself over the past >6 months which he states is similar to how he felt prior to his CABG. Denies CP, dizziness, diaphoresis, palpitations, LE edema, orthopnea, syncope, N/V, abdominal pain, f/c, sick contact, recent travel.    In light of pt's risk factors, CCS Anginal Class III Equivalent Sx, known h/o CAD/PAD, pt referred for cardiac cath with possible intervention.

## 2022-01-04 NOTE — H&P ADULT - NSHPLABSRESULTS_GEN_ALL_CORE
ECG: ECG: NSR @ 63bpm, flat T waves II, III, aVF, QTc 462 ECG: NSR @ 63bpm, flat T waves II, III, aVF, QTc 462    Labs:                          14.8   6.85  )-----------( 204      ( 28 Jan 2022 08:32 )             47.3       Auto Neutrophil %: 58.6 % (01-28-22 @ 08:32)  Auto Immature Granulocyte %: 1.0 % (01-28-22 @ 08:32)         Coags:     11.7   ----< 0.97    ( 28 Jan 2022 08:32 )     32.2 ECG: NSR @ 63bpm, flat T waves II, III, aVF, QTc 462    Labs:                          14.8   6.85  )-----------( 204      ( 28 Jan 2022 08:32 )             47.3       Auto Neutrophil %: 58.6 % (01-28-22 @ 08:32)  Auto Immature Granulocyte %: 1.0 % (01-28-22 @ 08:32)    01-28    145  |  107  |  24<H>  ----------------------------<  115<H>  3.6   |  24  |  1.26      Calcium, Total Serum: 9.0 mg/dL (01-28-22 @ 08:32)      LFTs:             7.3  | 0.4  | 26       ------------------[63      ( 28 Jan 2022 08:32 )  4.5  | x    | 29            Coags:     11.7   ----< 0.97    ( 28 Jan 2022 08:32 )     32.2        CARDIAC MARKERS ( 28 Jan 2022 08:32 )  x     / x     / 104 U/L / x     / 2.8 ng/mL

## 2022-01-04 NOTE — H&P ADULT - NSICDXFAMILYHX_GEN_ALL_CORE_FT
FAMILY HISTORY:  Mother  Still living? Unknown  Family history of MI (myocardial infarction), Age at diagnosis: Age Unknown    Uncle  Still living? Unknown  Family history of early CAD, Age at diagnosis: Age Unknown

## 2022-01-04 NOTE — H&P ADULT - NSHPSOCIALHISTORY_GEN_ALL_CORE
EtoH: social wine but previously excessive daily EtoH use for many years  Recreational Marijuana use - last used 2 months ago  Denies Tob use. EtoH: social wine but previously excessive daily EtoH use for many years. Drank over holidays but can go periods of time without alcohol.  Recreational Marijuana use   Denies Tob use.

## 2022-01-04 NOTE — H&P ADULT - NSICDXPASTSURGICALHX_GEN_ALL_CORE_FT
PAST SURGICAL HISTORY:  History of carotid stenosis     S/P CABG (coronary artery bypass graft) [facility] 2011 (4V per pt)

## 2022-01-04 NOTE — H&P ADULT - ASSESSMENT
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 Agosto, TIA 2/2019), prostate cancer (dx in 2017 ago, monitoring), CKD III (baseline Cr 1.3) who presented to Cardiologist Dr. Tang c/o BALDWIN upon ambulation of 2 city blocks or 1 after climbing 1 flight of stairs, resolving with rest, pt now presents for cardiac cath with possible intervention if clinically indicated.    				  ASA _____				Mallampati class: _________	               A/P:      Sedation Plan:  Moderate    Patient Is Suitable Candidate For Sedation: Yes       Risks & benefits of procedure and sedation and risks and benefits for the alternative therapy have been explained to the patient in layman’s terms including but not limited to: allergic reaction, bleeding, infection, arrhythmia, respiratory compromise, renal and vascular compromise, limb damage, MI, CVA, emergent CABG/Vascular Surgery and death.     -Informed consent obtained and in chart.  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 Agosto, TIA 2/2019), prostate cancer (dx in 2017 ago, monitoring), CKD III (baseline Cr 1.3) who presented to Cardiologist Dr. Tang c/o BALDWIN upon ambulation of 2 city blocks or 1 after climbing 1 flight of stairs, resolving with rest, pt now presents for cardiac cath with possible intervention if clinically indicated.    				  ASA __III___				Mallampati class: ___III______	               A/P:      Sedation Plan:  Moderate    Patient Is Suitable Candidate For Sedation: Yes       Risks & benefits of procedure and sedation and risks and benefits for the alternative therapy have been explained to the patient in layman’s terms including but not limited to: allergic reaction, bleeding, infection, arrhythmia, respiratory compromise, renal and vascular compromise, limb damage, MI, CVA, emergent CABG/Vascular Surgery and death.     -Informed consent obtained and in chart.  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 Agosto, TIA 2/2019), prostate cancer (dx in 2017 ago, monitoring), CKD III (baseline Cr 1.3) who presented to Cardiologist Dr. Tang c/o BALDWIN upon ambulation of 2 city blocks or 1 after climbing 1 flight of stairs, resolving with rest, pt now presents for cardiac cath with possible intervention if clinically indicated.    				  ASA __III___				Mallampati class: ___III______	               A/P:      Sedation Plan:  Moderate    Patient Is Suitable Candidate For Sedation: Yes       Risks & benefits of procedure and sedation and risks and benefits for the alternative therapy have been explained to the patient in layman’s terms including but not limited to: allergic reaction, bleeding, infection, arrhythmia, respiratory compromise, renal and vascular compromise, limb damage, MI, CVA, emergent CABG/Vascular Surgery and death.     -Informed consent obtained and in chart.  -meds confirmed with the patient  -EF 35%, no bolus, IVF: NS @ 50/hr as per fellow Dr. Boswell  -FERNANDO/Plavix load

## 2022-01-28 ENCOUNTER — OUTPATIENT (OUTPATIENT)
Dept: OUTPATIENT SERVICES | Facility: HOSPITAL | Age: 67
LOS: 1 days | Discharge: HOME | End: 2022-01-28
Payer: MEDICARE

## 2022-01-28 DIAGNOSIS — Z95.1 PRESENCE OF AORTOCORONARY BYPASS GRAFT: Chronic | ICD-10-CM

## 2022-01-28 DIAGNOSIS — Z86.79 PERSONAL HISTORY OF OTHER DISEASES OF THE CIRCULATORY SYSTEM: Chronic | ICD-10-CM

## 2022-01-28 LAB
A1C WITH ESTIMATED AVERAGE GLUCOSE RESULT: 5.9 % — HIGH (ref 4–5.6)
ALBUMIN SERPL ELPH-MCNC: 4.5 G/DL — SIGNIFICANT CHANGE UP (ref 3.3–5)
ALP SERPL-CCNC: 63 U/L — SIGNIFICANT CHANGE UP (ref 40–120)
ALT FLD-CCNC: 29 U/L — SIGNIFICANT CHANGE UP (ref 10–45)
ANION GAP SERPL CALC-SCNC: 14 MMOL/L — SIGNIFICANT CHANGE UP (ref 5–17)
APTT BLD: 32.2 SEC — SIGNIFICANT CHANGE UP (ref 27.5–35.5)
AST SERPL-CCNC: 26 U/L — SIGNIFICANT CHANGE UP (ref 10–40)
BASOPHILS # BLD AUTO: 0.07 K/UL — SIGNIFICANT CHANGE UP (ref 0–0.2)
BASOPHILS NFR BLD AUTO: 1 % — SIGNIFICANT CHANGE UP (ref 0–2)
BILIRUB SERPL-MCNC: 0.4 MG/DL — SIGNIFICANT CHANGE UP (ref 0.2–1.2)
BUN SERPL-MCNC: 24 MG/DL — HIGH (ref 7–23)
CALCIUM SERPL-MCNC: 9 MG/DL — SIGNIFICANT CHANGE UP (ref 8.4–10.5)
CHLORIDE SERPL-SCNC: 107 MMOL/L — SIGNIFICANT CHANGE UP (ref 96–108)
CHOLEST SERPL-MCNC: 107 MG/DL — SIGNIFICANT CHANGE UP
CK MB CFR SERPL CALC: 2.8 NG/ML — SIGNIFICANT CHANGE UP (ref 0–6.7)
CK SERPL-CCNC: 104 U/L — SIGNIFICANT CHANGE UP (ref 30–200)
CO2 SERPL-SCNC: 24 MMOL/L — SIGNIFICANT CHANGE UP (ref 22–31)
CREAT SERPL-MCNC: 1.26 MG/DL — SIGNIFICANT CHANGE UP (ref 0.5–1.3)
EOSINOPHIL # BLD AUTO: 0.19 K/UL — SIGNIFICANT CHANGE UP (ref 0–0.5)
EOSINOPHIL NFR BLD AUTO: 2.8 % — SIGNIFICANT CHANGE UP (ref 0–6)
ESTIMATED AVERAGE GLUCOSE: 123 MG/DL — HIGH (ref 68–114)
GLUCOSE SERPL-MCNC: 115 MG/DL — HIGH (ref 70–99)
HCT VFR BLD CALC: 47.3 % — SIGNIFICANT CHANGE UP (ref 39–50)
HDLC SERPL-MCNC: 36 MG/DL — LOW
HGB BLD-MCNC: 14.8 G/DL — SIGNIFICANT CHANGE UP (ref 13–17)
IMM GRANULOCYTES NFR BLD AUTO: 1 % — SIGNIFICANT CHANGE UP (ref 0–1.5)
INR BLD: 0.97 — SIGNIFICANT CHANGE UP (ref 0.88–1.16)
LIPID PNL WITH DIRECT LDL SERPL: 56 MG/DL — SIGNIFICANT CHANGE UP
LYMPHOCYTES # BLD AUTO: 1.59 K/UL — SIGNIFICANT CHANGE UP (ref 1–3.3)
LYMPHOCYTES # BLD AUTO: 23.2 % — SIGNIFICANT CHANGE UP (ref 13–44)
MCHC RBC-ENTMCNC: 27.9 PG — SIGNIFICANT CHANGE UP (ref 27–34)
MCHC RBC-ENTMCNC: 31.3 GM/DL — LOW (ref 32–36)
MCV RBC AUTO: 89.2 FL — SIGNIFICANT CHANGE UP (ref 80–100)
MONOCYTES # BLD AUTO: 0.92 K/UL — HIGH (ref 0–0.9)
MONOCYTES NFR BLD AUTO: 13.4 % — SIGNIFICANT CHANGE UP (ref 2–14)
NEUTROPHILS # BLD AUTO: 4.01 K/UL — SIGNIFICANT CHANGE UP (ref 1.8–7.4)
NEUTROPHILS NFR BLD AUTO: 58.6 % — SIGNIFICANT CHANGE UP (ref 43–77)
NON HDL CHOLESTEROL: 71 MG/DL — SIGNIFICANT CHANGE UP
NRBC # BLD: 0 /100 WBCS — SIGNIFICANT CHANGE UP (ref 0–0)
PLATELET # BLD AUTO: 204 K/UL — SIGNIFICANT CHANGE UP (ref 150–400)
POTASSIUM SERPL-MCNC: 3.6 MMOL/L — SIGNIFICANT CHANGE UP (ref 3.5–5.3)
POTASSIUM SERPL-SCNC: 3.6 MMOL/L — SIGNIFICANT CHANGE UP (ref 3.5–5.3)
PROT SERPL-MCNC: 7.3 G/DL — SIGNIFICANT CHANGE UP (ref 6–8.3)
PROTHROM AB SERPL-ACNC: 11.7 SEC — SIGNIFICANT CHANGE UP (ref 10.6–13.6)
RBC # BLD: 5.3 M/UL — SIGNIFICANT CHANGE UP (ref 4.2–5.8)
RBC # FLD: 13.8 % — SIGNIFICANT CHANGE UP (ref 10.3–14.5)
SODIUM SERPL-SCNC: 145 MMOL/L — SIGNIFICANT CHANGE UP (ref 135–145)
TRIGL SERPL-MCNC: 75 MG/DL — SIGNIFICANT CHANGE UP
WBC # BLD: 6.85 K/UL — SIGNIFICANT CHANGE UP (ref 3.8–10.5)
WBC # FLD AUTO: 6.85 K/UL — SIGNIFICANT CHANGE UP (ref 3.8–10.5)

## 2022-01-28 PROCEDURE — C1894: CPT

## 2022-01-28 PROCEDURE — 82550 ASSAY OF CK (CPK): CPT

## 2022-01-28 PROCEDURE — 99152 MOD SED SAME PHYS/QHP 5/>YRS: CPT

## 2022-01-28 PROCEDURE — 93005 ELECTROCARDIOGRAM TRACING: CPT

## 2022-01-28 PROCEDURE — 80053 COMPREHEN METABOLIC PANEL: CPT

## 2022-01-28 PROCEDURE — 93459 L HRT ART/GRFT ANGIO: CPT | Mod: 26

## 2022-01-28 PROCEDURE — C1769: CPT

## 2022-01-28 PROCEDURE — 83036 HEMOGLOBIN GLYCOSYLATED A1C: CPT

## 2022-01-28 PROCEDURE — C1887: CPT

## 2022-01-28 PROCEDURE — 85730 THROMBOPLASTIN TIME PARTIAL: CPT

## 2022-01-28 PROCEDURE — 99153 MOD SED SAME PHYS/QHP EA: CPT

## 2022-01-28 PROCEDURE — 82553 CREATINE MB FRACTION: CPT

## 2022-01-28 PROCEDURE — 85610 PROTHROMBIN TIME: CPT

## 2022-01-28 PROCEDURE — 93010 ELECTROCARDIOGRAM REPORT: CPT

## 2022-01-28 PROCEDURE — 93459 L HRT ART/GRFT ANGIO: CPT

## 2022-01-28 PROCEDURE — 80061 LIPID PANEL: CPT

## 2022-01-28 PROCEDURE — 85025 COMPLETE CBC W/AUTO DIFF WBC: CPT

## 2022-01-28 RX ORDER — ASPIRIN/CALCIUM CARB/MAGNESIUM 324 MG
1 TABLET ORAL
Qty: 0 | Refills: 0 | DISCHARGE

## 2022-01-28 RX ORDER — ASPIRIN/CALCIUM CARB/MAGNESIUM 324 MG
325 TABLET ORAL ONCE
Refills: 0 | Status: COMPLETED | OUTPATIENT
Start: 2022-01-28 | End: 2022-01-28

## 2022-01-28 RX ORDER — SACUBITRIL AND VALSARTAN 24; 26 MG/1; MG/1
1 TABLET, FILM COATED ORAL
Qty: 0 | Refills: 0 | DISCHARGE

## 2022-01-28 RX ORDER — POTASSIUM CHLORIDE 20 MEQ
40 PACKET (EA) ORAL ONCE
Refills: 0 | Status: COMPLETED | OUTPATIENT
Start: 2022-01-28 | End: 2022-01-28

## 2022-01-28 RX ORDER — AMLODIPINE BESYLATE 2.5 MG/1
1 TABLET ORAL
Qty: 0 | Refills: 0 | DISCHARGE

## 2022-01-28 RX ORDER — DAPAGLIFLOZIN 10 MG/1
1 TABLET, FILM COATED ORAL
Qty: 0 | Refills: 0 | DISCHARGE

## 2022-01-28 RX ORDER — CLOPIDOGREL BISULFATE 75 MG/1
600 TABLET, FILM COATED ORAL ONCE
Refills: 0 | Status: COMPLETED | OUTPATIENT
Start: 2022-01-28 | End: 2022-01-28

## 2022-01-28 RX ORDER — SODIUM CHLORIDE 9 MG/ML
500 INJECTION INTRAMUSCULAR; INTRAVENOUS; SUBCUTANEOUS
Refills: 0 | Status: DISCONTINUED | OUTPATIENT
Start: 2022-01-28 | End: 2022-02-11

## 2022-01-28 RX ORDER — CLOPIDOGREL BISULFATE 75 MG/1
1 TABLET, FILM COATED ORAL
Qty: 0 | Refills: 0 | DISCHARGE

## 2022-01-28 RX ORDER — SODIUM CHLORIDE 9 MG/ML
500 INJECTION INTRAMUSCULAR; INTRAVENOUS; SUBCUTANEOUS
Refills: 0 | Status: DISCONTINUED | OUTPATIENT
Start: 2022-01-28 | End: 2022-01-28

## 2022-01-28 RX ORDER — ATORVASTATIN CALCIUM 80 MG/1
1 TABLET, FILM COATED ORAL
Qty: 0 | Refills: 0 | DISCHARGE

## 2022-01-28 RX ORDER — METOPROLOL TARTRATE 50 MG
1 TABLET ORAL
Qty: 0 | Refills: 0 | DISCHARGE

## 2022-01-28 RX ORDER — EZETIMIBE 10 MG/1
1 TABLET ORAL
Qty: 0 | Refills: 0 | DISCHARGE

## 2022-01-28 RX ADMIN — Medication 325 MILLIGRAM(S): at 09:17

## 2022-01-28 RX ADMIN — CLOPIDOGREL BISULFATE 600 MILLIGRAM(S): 75 TABLET, FILM COATED ORAL at 09:17

## 2022-01-28 RX ADMIN — SODIUM CHLORIDE 50 MILLILITER(S): 9 INJECTION INTRAMUSCULAR; INTRAVENOUS; SUBCUTANEOUS at 09:24

## 2022-01-28 RX ADMIN — Medication 40 MILLIEQUIVALENT(S): at 11:55

## 2022-01-28 RX ADMIN — SODIUM CHLORIDE 100 MILLILITER(S): 9 INJECTION INTRAMUSCULAR; INTRAVENOUS; SUBCUTANEOUS at 12:36

## 2022-01-28 NOTE — PROGRESS NOTE ADULT - SUBJECTIVE AND OBJECTIVE BOX
Interventional Cardiology PA SDA Discharge Note    Patient without complaints. 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 Agosto, TIA 2/2019), prostate cancer (dx in 2017 ago, monitoring), CKD III (baseline Cr 1.3) who presented for cardiac cath due to patient's risk factors, CCS Angina Class III Equivalent Symptoms, and known CAD. Patient s/p diagnostic cardiac cath 1/28/22 revealing native 3V CAD, patent grafts x 3 LIMA-LAD, free MIGUEL-Diag 1, SVG-OM1, known occluded SVG to RCA, mRCA  which fills via right to right and left to right collaterals, LVEDP 15 mm Hg, LVEF 30%.       1.	Stable for discharge as per attending Dr. Wheeler after bed rest, pt voids, left wrist stable and 30 minutes of ambulation.  2.	Follow-up with PMD/Cardiologist Dr. Tang in 1-2 weeks  3.	Discharged forms signed and copies in chart

## 2022-02-07 DIAGNOSIS — Z95.1 PRESENCE OF AORTOCORONARY BYPASS GRAFT: ICD-10-CM

## 2022-02-07 DIAGNOSIS — I25.82 CHRONIC TOTAL OCCLUSION OF CORONARY ARTERY: ICD-10-CM

## 2022-02-07 DIAGNOSIS — I25.10 ATHEROSCLEROTIC HEART DISEASE OF NATIVE CORONARY ARTERY WITHOUT ANGINA PECTORIS: ICD-10-CM

## 2022-03-24 NOTE — PATIENT PROFILE ADULT - NSCAGESTDRUGGUILT_GEN_A_NUR
Patient wife, Bridget, called requesting a refill of Tamsulosin 0.4 mg which was prescribed by his previous provider.   no

## 2023-01-05 NOTE — PATIENT PROFILE ADULT - ..
As certified below, I, or a nurse practitioner or physician assistant working with me, had a face-to-face encounter that meets the physician face-to-face encounter requirements. 12-Dec-2018 18:05:30

## 2023-02-23 NOTE — HISTORY OF PRESENT ILLNESS
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 It has gotten significantly worse recently to the point that he an barely walk 1.5 to 2 blocks before he experiences right thigh and calf pain, "sharp and heaviness", that he has to stop.  He would rest for a few moments before resuming walking, only to be stopped again after similar distance.  Climbing stairs is especially difficult.  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  There is no resting pain, pallor, rubor, coldness, ulcer of gangrene in the right lower extremity.  He denies recent h/o CP, palpitation, PND, or orthopnea.  There is mild BALDWIN concomitant with leg pain upon exertion. During recent cardiac catheterization, he was found to have inferior wall scar with occluded RCA and RCA graft but patent LIMA-LAD, SVG-OM and free Tracie-D1.  He was also noted to have right external iliac occlusion not involving the ostium of right internal iliac artery or right common femoral artery. none of the above

## 2023-08-19 NOTE — ED PROVIDER NOTE - NSTIMEPROVIDERCAREINITIATE_GEN_ER
tachycardic vomiting coffee-ground emesis. Patient initiated    He does complain of some nausea vomiting, states started to throw up today. Nursing Notes were all reviewed and agreed with or any disagreements were addressed in the HPI. 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 REVIEW OF SYSTEMS :        ENCOUNTER LIMITATION:    Please note that the HPI, ROS, Past History, and Physical Examination are limited due to this patient's acuity of illness. Review of Systems:   A complete review of systems was unable to be performed secondary to the limitations noted above    Patient is placed on cardiac monitor and continuous pulse ox for monitoring. EKG is ordered to evaluate patient's current cardiac rhythm, and to interpret QT interval prior to administering medications. POCT glucose ordered to rule out acute hyperglycemia or hypoglycemia. CBC is ordered to evaluate for any signs of infection or inflammation by obtaining a WBC count, or any signs of acute anemia by interpreting hemoglobin. CMP was ordered to evaluate for any electrolyte imbalances, kidney function, hepatic injury or any elevations in anion gap. Troponin ordered to evaluate for possible cardiac etiology of symptoms including but not limited to STEMI or NSTEMI. Urinalysis ordered to evaluate for a UTI and/or hematuria. Lipase level was ordered to evaluate for possible elevations which suggest pancreatic etiology of symptoms. Lactic acid level obtained to evaluate for signs of organ hypoperfusion or ischemia. Protime-INR ordered in case hemorrhages are found on imaging that requires anticoagulation reversal or surgical intervention. Blood cultures are ordered to evaluate, rule out and, if present, treat bacteremia with antibiotics narrowed down to the found organism.  BNP ordered 01-Feb-2019 23:44

## 2023-11-10 NOTE — ED ADULT TRIAGE NOTE - NS ED TRIAGE CLINICAL UPGRADE
Deteriorating patient status - Patient was clinically upgraded due to deteriorating patient status. VC edema

## 2023-11-20 NOTE — ED PROVIDER NOTE - PMH
CAD (coronary artery disease)    Diabetes  recently diagnosed 1 month ago  Essential hypertension    Hyperlipidemia    Osteoarthritis  of b/l feet  PVD (peripheral vascular disease) Xray Chest 1 View- PORTABLE-Urgent

## 2024-12-25 PROBLEM — F10.90 ALCOHOL USE: Status: ACTIVE | Noted: 2018-12-11

## 2025-06-24 ENCOUNTER — LABORATORY RESULT (OUTPATIENT)
Age: 70
End: 2025-06-24

## 2025-06-24 ENCOUNTER — APPOINTMENT (OUTPATIENT)
Dept: NEPHROLOGY | Facility: CLINIC | Age: 70
End: 2025-06-24
Payer: MEDICARE

## 2025-06-24 ENCOUNTER — NON-APPOINTMENT (OUTPATIENT)
Age: 70
End: 2025-06-24

## 2025-06-24 VITALS — HEART RATE: 100 BPM | SYSTOLIC BLOOD PRESSURE: 128 MMHG | DIASTOLIC BLOOD PRESSURE: 75 MMHG

## 2025-06-24 VITALS — BODY MASS INDEX: 27.11 KG/M2 | WEIGHT: 183 LBS | HEIGHT: 69 IN

## 2025-06-24 PROBLEM — R68.89 OTHER GENERAL SYMPTOMS AND SIGNS: Status: ACTIVE | Noted: 2025-06-24

## 2025-06-24 PROBLEM — R79.9 BLOOD CHEMISTRY ABNORMALITY: Status: ACTIVE | Noted: 2025-06-24

## 2025-06-24 PROBLEM — Z79.899 OTHER LONG TERM (CURRENT) DRUG THERAPY: Status: ACTIVE | Noted: 2025-06-24

## 2025-06-24 PROCEDURE — 36415 COLL VENOUS BLD VENIPUNCTURE: CPT

## 2025-06-24 PROCEDURE — G2211 COMPLEX E/M VISIT ADD ON: CPT

## 2025-06-24 PROCEDURE — 99204 OFFICE O/P NEW MOD 45 MIN: CPT

## 2025-06-29 LAB
25(OH)D3 SERPL-MCNC: 25.4 NG/ML
ALBUMIN MFR SERPL ELPH: 52.9 %
ALBUMIN SERPL ELPH-MCNC: 4.5 G/DL
ALBUMIN SERPL-MCNC: 3.9 G/DL
ALBUMIN/GLOB SERPL: 1.1 RATIO
ALBUPE: 32.5 %
ALP BLD-CCNC: 266 U/L
ALPHA1 GLOB MFR SERPL ELPH: 7.1 %
ALPHA1 GLOB SERPL ELPH-MCNC: 0.5 G/DL
ALPHA1UPE: 30.8 %
ALPHA2 GLOB MFR SERPL ELPH: 12.2 %
ALPHA2 GLOB SERPL ELPH-MCNC: 0.9 G/DL
ALPHA2UPE: 12.5 %
ALT SERPL-CCNC: 23 U/L
ANION GAP SERPL CALC-SCNC: 18 MMOL/L
APPEARANCE: CLEAR
AST SERPL-CCNC: 31 U/L
B-GLOBULIN MFR SERPL ELPH: 13.4 %
B-GLOBULIN SERPL ELPH-MCNC: 1 G/DL
BASOPHILS # BLD AUTO: 0.1 K/UL
BASOPHILS NFR BLD AUTO: 1.7 %
BETAUPE: 16.1 %
BILIRUB SERPL-MCNC: 1.4 MG/DL
BILIRUBIN URINE: NEGATIVE
BLOOD URINE: NEGATIVE
BUN SERPL-MCNC: 24 MG/DL
CALCIUM SERPL-MCNC: 9.7 MG/DL
CALCIUM SERPL-MCNC: 9.7 MG/DL
CHLORIDE SERPL-SCNC: 105 MMOL/L
CO2 SERPL-SCNC: 19 MMOL/L
COLOR: YELLOW
CREAT SERPL-MCNC: 1.54 MG/DL
CREAT SPEC-SCNC: 113 MG/DL
CREAT SPEC-SCNC: 113 MG/DL
CREAT/PROT UR: 0.4 RATIO
CYSTATIN C SERPL-MCNC: 1.73 MG/L
DEPRECATED KAPPA LC FREE/LAMBDA SER: 1.57 RATIO
EGFRCR SERPLBLD CKD-EPI 2021: 49 ML/MIN/1.73M2
EOSINOPHIL # BLD AUTO: 0.36 K/UL
EOSINOPHIL NFR BLD AUTO: 6.1 %
FERRITIN SERPL-MCNC: 81 NG/ML
GAMMA GLOB FLD ELPH-MCNC: 1.1 G/DL
GAMMA GLOB MFR SERPL ELPH: 14.4 %
GAMMAUPE: 8.1 %
GFR/BSA.PRED SERPLBLD CYS-BASED-ARV: 36 ML/MIN/1.73M2
GLUCOSE QUALITATIVE U: >=1000 MG/DL
GLUCOSE SERPL-MCNC: 96 MG/DL
HCT VFR BLD CALC: 38.8 %
HGB BLD-MCNC: 11.5 G/DL
IGA 24H UR QL IFE: NORMAL
IGA SERPL-MCNC: 288 MG/DL
IGG SERPL-MCNC: 1131 MG/DL
IGM SERPL-MCNC: 52 MG/DL
INTERPRETATION SERPL IEP-IMP: NORMAL
KAPPA LC 24H UR QL: NORMAL
KAPPA LC CSF-MCNC: 2.83 MG/DL
KAPPA LC SERPL-MCNC: 4.43 MG/DL
KETONES URINE: NEGATIVE MG/DL
LEUKOCYTE ESTERASE URINE: NEGATIVE
LYMPHOCYTES # BLD AUTO: 0.05 K/UL
LYMPHOCYTES NFR BLD AUTO: 0.9 %
M PROTEIN SPEC IFE-MCNC: NORMAL
MAGNESIUM SERPL-MCNC: 2 MG/DL
MAN DIFF?: NORMAL
MCHC RBC-ENTMCNC: 26.5 PG
MCHC RBC-ENTMCNC: 29.6 G/DL
MCV RBC AUTO: 89.4 FL
MICROALBUMIN 24H UR DL<=1MG/L-MCNC: 13.3 MG/DL
MICROALBUMIN/CREAT 24H UR-RTO: 118 MG/G
MONOCYTES # BLD AUTO: 0.31 K/UL
MONOCYTES NFR BLD AUTO: 5.2 %
NEUTROPHILS # BLD AUTO: 4.78 K/UL
NEUTROPHILS NFR BLD AUTO: 80 %
NITRITE URINE: NEGATIVE
PARATHYROID HORMONE INTACT: 62 PG/ML
PH URINE: 6
PHOSPHATE SERPL-MCNC: 4.2 MG/DL
PLATELET # BLD AUTO: 177 K/UL
POTASSIUM SERPL-SCNC: 4.6 MMOL/L
PROT PATTERN 24H UR ELPH-IMP: NORMAL
PROT SERPL-MCNC: 7.3 G/DL
PROT SERPL-MCNC: 7.4 G/DL
PROT SERPL-MCNC: 7.4 G/DL
PROT UR-MCNC: 45 MG/DL
PROT UR-MCNC: 45 MG/DL
PROT UR-MCNC: 46 MG/DL
PROTEIN URINE: 30 MG/DL
RBC # BLD: 4.34 M/UL
RBC # FLD: 21 %
SODIUM SERPL-SCNC: 142 MMOL/L
SPECIFIC GRAVITY URINE: 1.02
URATE SERPL-MCNC: 6.7 MG/DL
UROBILINOGEN URINE: 1 MG/DL
WBC # FLD AUTO: 5.98 K/UL

## 2025-08-26 ENCOUNTER — LABORATORY RESULT (OUTPATIENT)
Age: 70
End: 2025-08-26

## 2025-08-26 ENCOUNTER — APPOINTMENT (OUTPATIENT)
Dept: NEPHROLOGY | Facility: CLINIC | Age: 70
End: 2025-08-26
Payer: MEDICARE

## 2025-08-26 VITALS — WEIGHT: 182 LBS | BODY MASS INDEX: 26.88 KG/M2

## 2025-08-26 VITALS — DIASTOLIC BLOOD PRESSURE: 75 MMHG | HEART RATE: 45 BPM | SYSTOLIC BLOOD PRESSURE: 127 MMHG

## 2025-08-26 DIAGNOSIS — N18.30 CHRONIC KIDNEY DISEASE, STAGE 3 UNSPECIFIED: ICD-10-CM

## 2025-08-26 DIAGNOSIS — M10.9 GOUT, UNSPECIFIED: ICD-10-CM

## 2025-08-26 DIAGNOSIS — E66.811 OBESITY, CLASS 1: ICD-10-CM

## 2025-08-26 DIAGNOSIS — E87.70 FLUID OVERLOAD, UNSPECIFIED: ICD-10-CM

## 2025-08-26 DIAGNOSIS — I50.42 CHRONIC COMBINED SYSTOLIC (CONGESTIVE) AND DIASTOLIC (CONGESTIVE) HEART FAILURE: ICD-10-CM

## 2025-08-26 DIAGNOSIS — R80.8 OTHER PROTEINURIA: ICD-10-CM

## 2025-08-26 DIAGNOSIS — I10 ESSENTIAL (PRIMARY) HYPERTENSION: ICD-10-CM

## 2025-08-26 PROCEDURE — G2211 COMPLEX E/M VISIT ADD ON: CPT

## 2025-08-26 PROCEDURE — 99214 OFFICE O/P EST MOD 30 MIN: CPT

## 2025-08-26 PROCEDURE — 36415 COLL VENOUS BLD VENIPUNCTURE: CPT

## 2025-08-26 RX ORDER — SPIRONOLACTONE 25 MG/1
25 TABLET ORAL DAILY
Qty: 90 | Refills: 3 | Status: ACTIVE | COMMUNITY
Start: 2025-08-26 | End: 1900-01-01

## 2025-08-26 RX ORDER — FUROSEMIDE 40 MG/1
40 TABLET ORAL
Qty: 90 | Refills: 0 | Status: ACTIVE | COMMUNITY
Start: 2025-08-26

## 2025-08-27 LAB
ALBUMIN SERPL ELPH-MCNC: 4.4 G/DL
ALBUMIN, RANDOM URINE: 34.5 MG/DL
ALP BLD-CCNC: 229 U/L
ALT SERPL-CCNC: 33 U/L
ANION GAP SERPL CALC-SCNC: 13 MMOL/L
APPEARANCE: CLEAR
AST SERPL-CCNC: 31 U/L
BASOPHILS # BLD AUTO: 0.11 K/UL
BASOPHILS NFR BLD AUTO: 1.5 %
BILIRUB SERPL-MCNC: 0.9 MG/DL
BILIRUBIN URINE: NEGATIVE
BLOOD URINE: NEGATIVE
BUN SERPL-MCNC: 28 MG/DL
CALCIUM SERPL-MCNC: 9.5 MG/DL
CHLORIDE SERPL-SCNC: 108 MMOL/L
CO2 SERPL-SCNC: 19 MMOL/L
COLOR: YELLOW
CREAT SERPL-MCNC: 1.22 MG/DL
CREAT SERPL-MCNC: 1.22 MG/DL
CREAT SPEC-SCNC: 80 MG/DL
CYSTATIN C SERPL-MCNC: 1.72 MG/L
EGFRCR SERPLBLD CKD-EPI 2021: 64 ML/MIN/1.73M2
EGFRCR SERPLBLD CKD-EPI 2021: 64 ML/MIN/1.73M2
EGFRCR-CYS SERPLBLD CKD-EPI 2021: 48 ML/MIN/1.73M2
EOSINOPHIL # BLD AUTO: 0.33 K/UL
EOSINOPHIL NFR BLD AUTO: 4.4 %
GFR/BSA.PRED SERPLBLD CYS-BASED-ARV: 36 ML/MIN/1.73M2
GLUCOSE QUALITATIVE U: >=1000 MG/DL
GLUCOSE SERPL-MCNC: 112 MG/DL
HCT VFR BLD CALC: 39.4 %
HGB BLD-MCNC: 11.8 G/DL
IMM GRANULOCYTES NFR BLD AUTO: 1.8 %
KETONES URINE: NEGATIVE MG/DL
LEUKOCYTE ESTERASE URINE: ABNORMAL
LYMPHOCYTES # BLD AUTO: 0.46 K/UL
LYMPHOCYTES NFR BLD AUTO: 6.2 %
MAN DIFF?: NORMAL
MCHC RBC-ENTMCNC: 25.4 PG
MCHC RBC-ENTMCNC: 29.9 G/DL
MCV RBC AUTO: 84.7 FL
MICROALBUMIN/CREAT 24H UR-RTO: 433 MG/G
MONOCYTES # BLD AUTO: 0.86 K/UL
MONOCYTES NFR BLD AUTO: 11.6 %
NEUTROPHILS # BLD AUTO: 5.53 K/UL
NEUTROPHILS NFR BLD AUTO: 74.5 %
NITRITE URINE: NEGATIVE
PH URINE: 6
PLATELET # BLD AUTO: 192 K/UL
POTASSIUM SERPL-SCNC: 5.2 MMOL/L
PROT SERPL-MCNC: 7.1 G/DL
PROTEIN URINE: 100 MG/DL
RBC # BLD: 4.65 M/UL
RBC # FLD: 20.2 %
SODIUM SERPL-SCNC: 140 MMOL/L
SPECIFIC GRAVITY URINE: 1.02
UROBILINOGEN URINE: 1 MG/DL
WBC # FLD AUTO: 7.42 K/UL